# Patient Record
Sex: MALE | Race: BLACK OR AFRICAN AMERICAN | NOT HISPANIC OR LATINO | Employment: UNEMPLOYED | URBAN - METROPOLITAN AREA
[De-identification: names, ages, dates, MRNs, and addresses within clinical notes are randomized per-mention and may not be internally consistent; named-entity substitution may affect disease eponyms.]

---

## 2023-11-24 ENCOUNTER — APPOINTMENT (OUTPATIENT)
Dept: RADIOLOGY | Facility: HOSPITAL | Age: 63
DRG: 175 | End: 2023-11-24
Payer: MEDICARE

## 2023-11-24 ENCOUNTER — HOSPITAL ENCOUNTER (INPATIENT)
Facility: HOSPITAL | Age: 63
LOS: 7 days | Discharge: HOME | DRG: 175 | End: 2023-12-01
Attending: STUDENT IN AN ORGANIZED HEALTH CARE EDUCATION/TRAINING PROGRAM | Admitting: INTERNAL MEDICINE
Payer: MEDICARE

## 2023-11-24 DIAGNOSIS — K21.9 GASTROESOPHAGEAL REFLUX DISEASE WITHOUT ESOPHAGITIS: ICD-10-CM

## 2023-11-24 DIAGNOSIS — R18.8 OTHER ASCITES: Primary | ICD-10-CM

## 2023-11-24 DIAGNOSIS — F17.209 NICOTINE DEPENDENCE WITH NICOTINE-INDUCED DISORDER, UNSPECIFIED NICOTINE PRODUCT TYPE: ICD-10-CM

## 2023-11-24 DIAGNOSIS — R10.84 GENERALIZED ABDOMINAL PAIN: ICD-10-CM

## 2023-11-24 DIAGNOSIS — R79.9 ABNORMAL FINDING OF BLOOD CHEMISTRY, UNSPECIFIED: ICD-10-CM

## 2023-11-24 DIAGNOSIS — K76.9 CHRONIC LIVER DISEASE: ICD-10-CM

## 2023-11-24 DIAGNOSIS — J44.9 CHRONIC OBSTRUCTIVE PULMONARY DISEASE, UNSPECIFIED COPD TYPE (MULTI): ICD-10-CM

## 2023-11-24 DIAGNOSIS — G47.00 INSOMNIA, UNSPECIFIED TYPE: ICD-10-CM

## 2023-11-24 DIAGNOSIS — M79.89 SWELLING OF BOTH LOWER EXTREMITIES: ICD-10-CM

## 2023-11-24 DIAGNOSIS — F20.9 SCHIZOPHRENIA, UNSPECIFIED TYPE (MULTI): ICD-10-CM

## 2023-11-24 DIAGNOSIS — I82.4Z9 DEEP VEIN THROMBOSIS (DVT) OF DISTAL VEIN OF LOWER EXTREMITY, UNSPECIFIED CHRONICITY, UNSPECIFIED LATERALITY (MULTI): ICD-10-CM

## 2023-11-24 DIAGNOSIS — I26.99 ACUTE PULMONARY EMBOLISM WITHOUT ACUTE COR PULMONALE, UNSPECIFIED PULMONARY EMBOLISM TYPE (MULTI): ICD-10-CM

## 2023-11-24 DIAGNOSIS — F10.10 ALCOHOL ABUSE: ICD-10-CM

## 2023-11-24 LAB
ALBUMIN SERPL BCP-MCNC: 1.8 G/DL (ref 3.4–5)
ALBUMIN SERPL BCP-MCNC: 2.2 G/DL (ref 3.4–5)
ALP SERPL-CCNC: 137 U/L (ref 33–136)
ALT SERPL W P-5'-P-CCNC: 20 U/L (ref 10–52)
ANION GAP BLDV CALCULATED.4IONS-SCNC: 2 MMOL/L (ref 10–25)
ANION GAP SERPL CALC-SCNC: 11 MMOL/L (ref 10–20)
ANION GAP SERPL CALC-SCNC: 12 MMOL/L (ref 10–20)
APTT PPP: 35 SECONDS (ref 27–38)
AST SERPL W P-5'-P-CCNC: 43 U/L (ref 9–39)
BASE EXCESS BLDV CALC-SCNC: 11.2 MMOL/L (ref -2–3)
BASOPHILS # BLD AUTO: 0.02 X10*3/UL (ref 0–0.1)
BASOPHILS NFR BLD AUTO: 0.2 %
BILIRUB SERPL-MCNC: 1.2 MG/DL (ref 0–1.2)
BNP SERPL-MCNC: 54 PG/ML (ref 0–99)
BODY TEMPERATURE: 37 DEGREES CELSIUS
BUN SERPL-MCNC: 5 MG/DL (ref 6–23)
BUN SERPL-MCNC: 6 MG/DL (ref 6–23)
CA-I BLDV-SCNC: 1.01 MMOL/L (ref 1.1–1.33)
CALCIUM SERPL-MCNC: 7 MG/DL (ref 8.6–10.6)
CALCIUM SERPL-MCNC: 7.8 MG/DL (ref 8.6–10.6)
CARDIAC TROPONIN I PNL SERPL HS: 30 NG/L (ref 0–53)
CHLORIDE BLDV-SCNC: 98 MMOL/L (ref 98–107)
CHLORIDE SERPL-SCNC: 97 MMOL/L (ref 98–107)
CHLORIDE SERPL-SCNC: 98 MMOL/L (ref 98–107)
CO2 SERPL-SCNC: 30 MMOL/L (ref 21–32)
CO2 SERPL-SCNC: 33 MMOL/L (ref 21–32)
CREAT SERPL-MCNC: 0.78 MG/DL (ref 0.5–1.3)
CREAT SERPL-MCNC: 0.87 MG/DL (ref 0.5–1.3)
EOSINOPHIL # BLD AUTO: 0.01 X10*3/UL (ref 0–0.7)
EOSINOPHIL NFR BLD AUTO: 0.1 %
ERYTHROCYTE [DISTWIDTH] IN BLOOD BY AUTOMATED COUNT: 16.8 % (ref 11.5–14.5)
FERRITIN SERPL-MCNC: 1115 NG/ML (ref 20–300)
FLUAV RNA RESP QL NAA+PROBE: NOT DETECTED
FLUBV RNA RESP QL NAA+PROBE: NOT DETECTED
FOLATE SERPL-MCNC: 23.4 NG/ML
GFR SERPL CREATININE-BSD FRML MDRD: >90 ML/MIN/1.73M*2
GFR SERPL CREATININE-BSD FRML MDRD: >90 ML/MIN/1.73M*2
GLUCOSE BLDV-MCNC: 120 MG/DL (ref 74–99)
GLUCOSE SERPL-MCNC: 111 MG/DL (ref 74–99)
GLUCOSE SERPL-MCNC: 124 MG/DL (ref 74–99)
HAV IGM SER QL: NONREACTIVE
HBV CORE IGM SER QL: NONREACTIVE
HBV SURFACE AG SERPL QL IA: NONREACTIVE
HCO3 BLDV-SCNC: 35.8 MMOL/L (ref 22–26)
HCT VFR BLD AUTO: 34 % (ref 41–52)
HCT VFR BLD EST: 30 % (ref 41–52)
HCV AB SER QL: NONREACTIVE
HGB BLD-MCNC: 11.8 G/DL (ref 13.5–17.5)
HGB BLDV-MCNC: 10.1 G/DL (ref 13.5–17.5)
IMM GRANULOCYTES # BLD AUTO: 0.03 X10*3/UL (ref 0–0.7)
IMM GRANULOCYTES NFR BLD AUTO: 0.3 % (ref 0–0.9)
INR PPP: 1.3 (ref 0.9–1.1)
IRON SATN MFR SERPL: ABNORMAL %
IRON SERPL-MCNC: 119 UG/DL (ref 35–150)
LACTATE BLDV-SCNC: 2 MMOL/L (ref 0.4–2)
LACTATE SERPL-SCNC: 2.4 MMOL/L (ref 0.4–2)
LACTATE SERPL-SCNC: 2.8 MMOL/L (ref 0.4–2)
LYMPHOCYTES # BLD AUTO: 1.96 X10*3/UL (ref 1.2–4.8)
LYMPHOCYTES NFR BLD AUTO: 18.6 %
MCH RBC QN AUTO: 35.5 PG (ref 26–34)
MCHC RBC AUTO-ENTMCNC: 34.7 G/DL (ref 32–36)
MCV RBC AUTO: 102 FL (ref 80–100)
MONOCYTES # BLD AUTO: 0.52 X10*3/UL (ref 0.1–1)
MONOCYTES NFR BLD AUTO: 4.9 %
NEUTROPHILS # BLD AUTO: 7.97 X10*3/UL (ref 1.2–7.7)
NEUTROPHILS NFR BLD AUTO: 75.9 %
NRBC BLD-RTO: 0 /100 WBCS (ref 0–0)
OXYHGB MFR BLDV: 43.5 % (ref 45–75)
PCO2 BLDV: 47 MM HG (ref 41–51)
PH BLDV: 7.49 PH (ref 7.33–7.43)
PHOSPHATE SERPL-MCNC: 3.5 MG/DL (ref 2.5–4.9)
PLATELET # BLD AUTO: 154 X10*3/UL (ref 150–450)
PO2 BLDV: 35 MM HG (ref 35–45)
POTASSIUM BLDV-SCNC: 4.2 MMOL/L (ref 3.5–5.3)
POTASSIUM SERPL-SCNC: 2.9 MMOL/L (ref 3.5–5.3)
POTASSIUM SERPL-SCNC: 3.7 MMOL/L (ref 3.5–5.3)
PROT SERPL-MCNC: 5.7 G/DL (ref 6.4–8.2)
PROTHROMBIN TIME: 14.1 SECONDS (ref 9.8–12.8)
RBC # BLD AUTO: 3.32 X10*6/UL (ref 4.5–5.9)
SAO2 % BLDV: 45 % (ref 45–75)
SARS-COV-2 RNA RESP QL NAA+PROBE: NOT DETECTED
SODIUM BLDV-SCNC: 132 MMOL/L (ref 136–145)
SODIUM SERPL-SCNC: 136 MMOL/L (ref 136–145)
SODIUM SERPL-SCNC: 138 MMOL/L (ref 136–145)
TIBC SERPL-MCNC: ABNORMAL UG/DL
UFH PPP CHRO-ACNC: 1.6 IU/ML
UIBC SERPL-MCNC: <55 UG/DL (ref 110–370)
VIT B12 SERPL-MCNC: 1349 PG/ML (ref 211–911)
WBC # BLD AUTO: 10.5 X10*3/UL (ref 4.4–11.3)

## 2023-11-24 PROCEDURE — 83550 IRON BINDING TEST: CPT

## 2023-11-24 PROCEDURE — 84295 ASSAY OF SERUM SODIUM: CPT

## 2023-11-24 PROCEDURE — 99285 EMERGENCY DEPT VISIT HI MDM: CPT | Performed by: STUDENT IN AN ORGANIZED HEALTH CARE EDUCATION/TRAINING PROGRAM

## 2023-11-24 PROCEDURE — 74177 CT ABD & PELVIS W/CONTRAST: CPT

## 2023-11-24 PROCEDURE — 2500000004 HC RX 250 GENERAL PHARMACY W/ HCPCS (ALT 636 FOR OP/ED)

## 2023-11-24 PROCEDURE — 85610 PROTHROMBIN TIME: CPT

## 2023-11-24 PROCEDURE — 99223 1ST HOSP IP/OBS HIGH 75: CPT | Performed by: INTERNAL MEDICINE

## 2023-11-24 PROCEDURE — 82435 ASSAY OF BLOOD CHLORIDE: CPT

## 2023-11-24 PROCEDURE — 2500000001 HC RX 250 WO HCPCS SELF ADMINISTERED DRUGS (ALT 637 FOR MEDICARE OP)

## 2023-11-24 PROCEDURE — 85025 COMPLETE CBC W/AUTO DIFF WBC: CPT | Performed by: STUDENT IN AN ORGANIZED HEALTH CARE EDUCATION/TRAINING PROGRAM

## 2023-11-24 PROCEDURE — 74177 CT ABD & PELVIS W/CONTRAST: CPT | Performed by: RADIOLOGY

## 2023-11-24 PROCEDURE — 82607 VITAMIN B-12: CPT

## 2023-11-24 PROCEDURE — 86381 MITOCHONDRIAL ANTIBODY EACH: CPT | Performed by: STUDENT IN AN ORGANIZED HEALTH CARE EDUCATION/TRAINING PROGRAM

## 2023-11-24 PROCEDURE — 80074 ACUTE HEPATITIS PANEL: CPT

## 2023-11-24 PROCEDURE — 83880 ASSAY OF NATRIURETIC PEPTIDE: CPT | Performed by: STUDENT IN AN ORGANIZED HEALTH CARE EDUCATION/TRAINING PROGRAM

## 2023-11-24 PROCEDURE — 96366 THER/PROPH/DIAG IV INF ADDON: CPT

## 2023-11-24 PROCEDURE — 93010 ELECTROCARDIOGRAM REPORT: CPT | Performed by: STUDENT IN AN ORGANIZED HEALTH CARE EDUCATION/TRAINING PROGRAM

## 2023-11-24 PROCEDURE — 86235 NUCLEAR ANTIGEN ANTIBODY: CPT | Performed by: STUDENT IN AN ORGANIZED HEALTH CARE EDUCATION/TRAINING PROGRAM

## 2023-11-24 PROCEDURE — 85018 HEMOGLOBIN: CPT

## 2023-11-24 PROCEDURE — 83605 ASSAY OF LACTIC ACID: CPT

## 2023-11-24 PROCEDURE — 86364 TISS TRNSGLTMNASE EA IG CLAS: CPT | Performed by: STUDENT IN AN ORGANIZED HEALTH CARE EDUCATION/TRAINING PROGRAM

## 2023-11-24 PROCEDURE — 83521 IG LIGHT CHAINS FREE EACH: CPT | Performed by: STUDENT IN AN ORGANIZED HEALTH CARE EDUCATION/TRAINING PROGRAM

## 2023-11-24 PROCEDURE — 71275 CT ANGIOGRAPHY CHEST: CPT

## 2023-11-24 PROCEDURE — 96365 THER/PROPH/DIAG IV INF INIT: CPT

## 2023-11-24 PROCEDURE — 84484 ASSAY OF TROPONIN QUANT: CPT | Performed by: STUDENT IN AN ORGANIZED HEALTH CARE EDUCATION/TRAINING PROGRAM

## 2023-11-24 PROCEDURE — 36415 COLL VENOUS BLD VENIPUNCTURE: CPT

## 2023-11-24 PROCEDURE — 85730 THROMBOPLASTIN TIME PARTIAL: CPT

## 2023-11-24 PROCEDURE — 1100000001 HC PRIVATE ROOM DAILY

## 2023-11-24 PROCEDURE — 36415 COLL VENOUS BLD VENIPUNCTURE: CPT | Performed by: STUDENT IN AN ORGANIZED HEALTH CARE EDUCATION/TRAINING PROGRAM

## 2023-11-24 PROCEDURE — 85520 HEPARIN ASSAY: CPT

## 2023-11-24 PROCEDURE — 82390 ASSAY OF CERULOPLASMIN: CPT | Performed by: STUDENT IN AN ORGANIZED HEALTH CARE EDUCATION/TRAINING PROGRAM

## 2023-11-24 PROCEDURE — 80053 COMPREHEN METABOLIC PANEL: CPT | Performed by: STUDENT IN AN ORGANIZED HEALTH CARE EDUCATION/TRAINING PROGRAM

## 2023-11-24 PROCEDURE — 2500000004 HC RX 250 GENERAL PHARMACY W/ HCPCS (ALT 636 FOR OP/ED): Performed by: STUDENT IN AN ORGANIZED HEALTH CARE EDUCATION/TRAINING PROGRAM

## 2023-11-24 PROCEDURE — 2550000001 HC RX 255 CONTRASTS: Performed by: EMERGENCY MEDICINE

## 2023-11-24 PROCEDURE — 84165 PROTEIN E-PHORESIS SERUM: CPT | Performed by: STUDENT IN AN ORGANIZED HEALTH CARE EDUCATION/TRAINING PROGRAM

## 2023-11-24 PROCEDURE — 82728 ASSAY OF FERRITIN: CPT

## 2023-11-24 PROCEDURE — 80061 LIPID PANEL: CPT

## 2023-11-24 PROCEDURE — 86015 ACTIN ANTIBODY EACH: CPT | Performed by: STUDENT IN AN ORGANIZED HEALTH CARE EDUCATION/TRAINING PROGRAM

## 2023-11-24 PROCEDURE — 82746 ASSAY OF FOLIC ACID SERUM: CPT

## 2023-11-24 PROCEDURE — 86038 ANTINUCLEAR ANTIBODIES: CPT | Performed by: STUDENT IN AN ORGANIZED HEALTH CARE EDUCATION/TRAINING PROGRAM

## 2023-11-24 PROCEDURE — 96375 TX/PRO/DX INJ NEW DRUG ADDON: CPT

## 2023-11-24 PROCEDURE — 87636 SARSCOV2 & INF A&B AMP PRB: CPT | Performed by: STUDENT IN AN ORGANIZED HEALTH CARE EDUCATION/TRAINING PROGRAM

## 2023-11-24 PROCEDURE — 2550000001 HC RX 255 CONTRASTS: Performed by: STUDENT IN AN ORGANIZED HEALTH CARE EDUCATION/TRAINING PROGRAM

## 2023-11-24 PROCEDURE — 99285 EMERGENCY DEPT VISIT HI MDM: CPT | Mod: 25 | Performed by: STUDENT IN AN ORGANIZED HEALTH CARE EDUCATION/TRAINING PROGRAM

## 2023-11-24 PROCEDURE — 86320 SERUM IMMUNOELECTROPHORESIS: CPT | Performed by: STUDENT IN AN ORGANIZED HEALTH CARE EDUCATION/TRAINING PROGRAM

## 2023-11-24 RX ORDER — ACETAMINOPHEN 650 MG/1
650 SUPPOSITORY RECTAL EVERY 4 HOURS PRN
Status: DISCONTINUED | OUTPATIENT
Start: 2023-11-24 | End: 2023-11-26

## 2023-11-24 RX ORDER — LORAZEPAM 1 MG/1
2 TABLET ORAL EVERY 2 HOUR PRN
Status: DISCONTINUED | OUTPATIENT
Start: 2023-11-24 | End: 2023-11-28

## 2023-11-24 RX ORDER — MORPHINE SULFATE 4 MG/ML
2 INJECTION INTRAVENOUS ONCE
Status: COMPLETED | OUTPATIENT
Start: 2023-11-24 | End: 2023-11-24

## 2023-11-24 RX ORDER — HEPARIN SODIUM 5000 [USP'U]/ML
2000-4000 INJECTION, SOLUTION INTRAVENOUS; SUBCUTANEOUS EVERY 4 HOURS PRN
Status: DISPENSED | OUTPATIENT
Start: 2023-11-24 | End: 2023-11-28

## 2023-11-24 RX ORDER — POTASSIUM CHLORIDE 14.9 MG/ML
20 INJECTION INTRAVENOUS
Status: COMPLETED | OUTPATIENT
Start: 2023-11-24 | End: 2023-11-24

## 2023-11-24 RX ORDER — HEPARIN SODIUM 10000 [USP'U]/100ML
0-4500 INJECTION, SOLUTION INTRAVENOUS CONTINUOUS
Status: DISPENSED | OUTPATIENT
Start: 2023-11-24 | End: 2023-11-28

## 2023-11-24 RX ORDER — LORAZEPAM 1 MG/1
1 TABLET ORAL EVERY 2 HOUR PRN
Status: DISCONTINUED | OUTPATIENT
Start: 2023-11-24 | End: 2023-11-28

## 2023-11-24 RX ORDER — HEPARIN SODIUM 5000 [USP'U]/ML
80 INJECTION, SOLUTION INTRAVENOUS; SUBCUTANEOUS ONCE
Status: COMPLETED | OUTPATIENT
Start: 2023-11-24 | End: 2023-11-24

## 2023-11-24 RX ORDER — FOLIC ACID 1 MG/1
1 TABLET ORAL DAILY
Status: DISCONTINUED | OUTPATIENT
Start: 2023-11-24 | End: 2023-12-01 | Stop reason: HOSPADM

## 2023-11-24 RX ORDER — CEFTRIAXONE 1 G/50ML
1 INJECTION, SOLUTION INTRAVENOUS ONCE
Status: COMPLETED | OUTPATIENT
Start: 2023-11-24 | End: 2023-11-24

## 2023-11-24 RX ORDER — MULTIVIT-MIN/IRON FUM/FOLIC AC 7.5 MG-4
1 TABLET ORAL DAILY
Status: DISCONTINUED | OUTPATIENT
Start: 2023-11-24 | End: 2023-12-01 | Stop reason: HOSPADM

## 2023-11-24 RX ORDER — ACETAMINOPHEN 325 MG/1
650 TABLET ORAL EVERY 4 HOURS PRN
Status: DISCONTINUED | OUTPATIENT
Start: 2023-11-24 | End: 2023-12-01 | Stop reason: HOSPADM

## 2023-11-24 RX ORDER — LORAZEPAM 0.5 MG/1
0.5 TABLET ORAL EVERY 2 HOUR PRN
Status: DISCONTINUED | OUTPATIENT
Start: 2023-11-24 | End: 2023-11-28

## 2023-11-24 RX ORDER — FLUTICASONE FUROATE AND VILANTEROL 200; 25 UG/1; UG/1
1 POWDER RESPIRATORY (INHALATION)
Status: DISCONTINUED | OUTPATIENT
Start: 2023-11-25 | End: 2023-12-01 | Stop reason: HOSPADM

## 2023-11-24 RX ORDER — ACETAMINOPHEN 160 MG/5ML
650 SOLUTION ORAL EVERY 4 HOURS PRN
Status: DISCONTINUED | OUTPATIENT
Start: 2023-11-24 | End: 2023-11-26

## 2023-11-24 RX ORDER — ACETAMINOPHEN 325 MG/1
650 TABLET ORAL EVERY 4 HOURS PRN
Status: DISCONTINUED | OUTPATIENT
Start: 2023-11-24 | End: 2023-11-26

## 2023-11-24 RX ORDER — ALBUTEROL SULFATE 0.83 MG/ML
2.5 SOLUTION RESPIRATORY (INHALATION) EVERY 6 HOURS PRN
Status: DISCONTINUED | OUTPATIENT
Start: 2023-11-24 | End: 2023-12-01 | Stop reason: HOSPADM

## 2023-11-24 RX ORDER — RISPERIDONE 1 MG/1
1 TABLET ORAL 2 TIMES DAILY
Status: DISCONTINUED | OUTPATIENT
Start: 2023-11-24 | End: 2023-12-01 | Stop reason: HOSPADM

## 2023-11-24 RX ADMIN — RISPERIDONE 1 MG: 1 TABLET ORAL at 21:14

## 2023-11-24 RX ADMIN — SODIUM CHLORIDE, POTASSIUM CHLORIDE, SODIUM LACTATE AND CALCIUM CHLORIDE 500 ML: 600; 310; 30; 20 INJECTION, SOLUTION INTRAVENOUS at 15:00

## 2023-11-24 RX ADMIN — IOHEXOL 75 ML: 350 INJECTION, SOLUTION INTRAVENOUS at 13:39

## 2023-11-24 RX ADMIN — FOLIC ACID 1 MG: 1 TABLET ORAL at 18:49

## 2023-11-24 RX ADMIN — Medication 1 TABLET: at 18:49

## 2023-11-24 RX ADMIN — ACETAMINOPHEN 650 MG: 325 TABLET ORAL at 18:49

## 2023-11-24 RX ADMIN — CEFTRIAXONE SODIUM 1 G: 1 INJECTION, SOLUTION INTRAVENOUS at 16:48

## 2023-11-24 RX ADMIN — POTASSIUM CHLORIDE 20 MEQ: 14.9 INJECTION, SOLUTION INTRAVENOUS at 14:21

## 2023-11-24 RX ADMIN — ACETAMINOPHEN 650 MG: 325 TABLET ORAL at 12:51

## 2023-11-24 RX ADMIN — HEPARIN SODIUM 4000 UNITS: 5000 INJECTION INTRAVENOUS; SUBCUTANEOUS at 17:33

## 2023-11-24 RX ADMIN — HEPARIN SODIUM 1790 UNITS/HR: 10000 INJECTION, SOLUTION INTRAVENOUS at 17:35

## 2023-11-24 RX ADMIN — POTASSIUM CHLORIDE 20 MEQ: 14.9 INJECTION, SOLUTION INTRAVENOUS at 12:32

## 2023-11-24 RX ADMIN — MORPHINE SULFATE 2 MG: 4 INJECTION INTRAVENOUS at 16:48

## 2023-11-24 RX ADMIN — IOHEXOL 61 ML: 350 INJECTION, SOLUTION INTRAVENOUS at 15:25

## 2023-11-24 ASSESSMENT — COGNITIVE AND FUNCTIONAL STATUS - GENERAL
MOBILITY SCORE: 24
DAILY ACTIVITIY SCORE: 24

## 2023-11-24 ASSESSMENT — PAIN SCALES - GENERAL
PAINLEVEL_OUTOF10: 8
PAINLEVEL_OUTOF10: 0 - NO PAIN
PAINLEVEL_OUTOF10: 8
PAINLEVEL_OUTOF10: 8

## 2023-11-24 ASSESSMENT — LIFESTYLE VARIABLES
ANXIETY: NO ANXIETY, AT EASE
EVER HAD A DRINK FIRST THING IN THE MORNING TO STEADY YOUR NERVES TO GET RID OF A HANGOVER: NO
HAVE YOU EVER FELT YOU SHOULD CUT DOWN ON YOUR DRINKING: NO
NAUSEA AND VOMITING: NO NAUSEA AND NO VOMITING
PAROXYSMAL SWEATS: NO SWEAT VISIBLE
REASON UNABLE TO ASSESS: NO
VISUAL DISTURBANCES: NOT PRESENT
ORIENTATION AND CLOUDING OF SENSORIUM: ORIENTED AND CAN DO SERIAL ADDITIONS
AUDITORY DISTURBANCES: NOT PRESENT
ORIENTATION AND CLOUDING OF SENSORIUM: ORIENTED AND CAN DO SERIAL ADDITIONS
PAROXYSMAL SWEATS: NO SWEAT VISIBLE
HAVE PEOPLE ANNOYED YOU BY CRITICIZING YOUR DRINKING: NO
TREMOR: NO TREMOR
HEADACHE, FULLNESS IN HEAD: NOT PRESENT
VISUAL DISTURBANCES: NOT PRESENT
ANXIETY: NO ANXIETY, AT EASE
HEADACHE, FULLNESS IN HEAD: NOT PRESENT
AGITATION: NORMAL ACTIVITY
NAUSEA AND VOMITING: NO NAUSEA AND NO VOMITING
AGITATION: NORMAL ACTIVITY
TOTAL SCORE: 0
AUDITORY DISTURBANCES: NOT PRESENT
TOTAL SCORE: 0
TREMOR: NO TREMOR
EVER FELT BAD OR GUILTY ABOUT YOUR DRINKING: NO

## 2023-11-24 ASSESSMENT — PAIN DESCRIPTION - PROGRESSION
CLINICAL_PROGRESSION: NOT CHANGED
CLINICAL_PROGRESSION: NOT CHANGED

## 2023-11-24 ASSESSMENT — COLUMBIA-SUICIDE SEVERITY RATING SCALE - C-SSRS
6. HAVE YOU EVER DONE ANYTHING, STARTED TO DO ANYTHING, OR PREPARED TO DO ANYTHING TO END YOUR LIFE?: NO
2. HAVE YOU ACTUALLY HAD ANY THOUGHTS OF KILLING YOURSELF?: NO
1. IN THE PAST MONTH, HAVE YOU WISHED YOU WERE DEAD OR WISHED YOU COULD GO TO SLEEP AND NOT WAKE UP?: NO

## 2023-11-24 ASSESSMENT — PAIN - FUNCTIONAL ASSESSMENT: PAIN_FUNCTIONAL_ASSESSMENT: 0-10

## 2023-11-24 NOTE — H&P
History Of Present Illness  Junito Rodríguez is a 62 y.o. male with past medical history of depression, anxiety, COPD(PFTs not on file), alcohol abuse disorder, significant tobacco use, and chronic lower back pain.  Patient presented to the ED with 7-day history of leg swelling,Abdominal swelling, shortness of breath, and anorexia.  Patient stated that the symptoms started spontaneously 7 days ago.  Shortness of breath is worse with activity and relieved by rest.  Leg swelling is bilateral  has been present for a week and is painful.  Anorexia seem to be due to aversion to eating not by pain or nausea patient is consuming liquids without issue patient has not passed stool for the past week however passes gas.  History notable for PND, exertional dyspnea, calf pain, weight loss of 30 pounds over the past 3.5 months, and positional dizziness.  Patient denies recent illness, recent travel, chest pain, palpitations, difficulty urinating, any signs of bleeding, nausea, vomiting, fever, and chills.    Patient was seen in the ED found to have hypokalemia that was repleted.  CT abdomen was ordered that suggested filling defect it was followed up by a CT PE which showed bilateral PEs right greater than left.    ED course:  Vitals :  Heart rate: 122.  /84.  96% O2 on room air.  37.1 °C.  Respiratory rate 19.      Labs:  Labs Reviewed   CBC WITH AUTO DIFFERENTIAL - Abnormal       Result Value    WBC 10.5      nRBC 0.0      RBC 3.32 (*)     Hemoglobin 11.8 (*)     Hematocrit 34.0 (*)      (*)     MCH 35.5 (*)     MCHC 34.7      RDW 16.8 (*)     Platelets 154      Neutrophils % 75.9      Immature Granulocytes %, Automated 0.3      Lymphocytes % 18.6      Monocytes % 4.9      Eosinophils % 0.1      Basophils % 0.2      Neutrophils Absolute 7.97 (*)     Immature Granulocytes Absolute, Automated 0.03      Lymphocytes Absolute 1.96      Monocytes Absolute 0.52      Eosinophils Absolute 0.01      Basophils Absolute 0.02      COMPREHENSIVE METABOLIC PANEL - Abnormal    Glucose 124 (*)     Sodium 138      Potassium 2.9 (*)     Chloride 97 (*)     Bicarbonate 33 (*)     Anion Gap 11      Urea Nitrogen 5 (*)     Creatinine 0.87      eGFR >90      Calcium 7.8 (*)     Albumin 2.2 (*)     Alkaline Phosphatase 137 (*)     Total Protein 5.7 (*)     AST 43 (*)     Bilirubin, Total 1.2      ALT 20     LACTATE - Abnormal    Lactate 2.4 (*)     Narrative:     Venipuncture immediately after or during the administration of Metamizole may lead to falsely low results. Testing should be performed immediately  prior to Metamizole dosing.   PROTIME-INR - Abnormal    Protime 14.1 (*)     INR 1.3 (*)    BLOOD GAS VENOUS FULL PANEL UNSOLICITED - Abnormal    POCT pH, Venous 7.49 (*)     POCT pCO2, Venous 47      POCT pO2, Venous 35      POCT SO2, Venous 45      POCT Oxy Hemoglobin, Venous 43.5 (*)     POCT Hematocrit Calculated, Venous 30.0 (*)     POCT Sodium, Venous 132 (*)     POCT Potassium, Venous 4.2      POCT Chloride, Venous 98      POCT Ionized Calicum, Venous 1.01 (*)     POCT Glucose, Venous 120 (*)     POCT Lactate, Venous 2.0      POCT Base Excess, Venous 11.2 (*)     POCT HCO3 Calculated, Venous 35.8 (*)     POCT Hemoglobin, Venous 10.1 (*)     POCT Anion Gap, Venous 2.0 (*)     Patient Temperature 37.0     TROPONIN I, HIGH SENSITIVITY - Normal    Troponin I, High Sensitivity 30      Narrative:     Less than 99th percentile of normal range cutoff-  Female and children under 18 years old <35 ng/L; Male <54 ng/L: Negative  Repeat testing should be performed if clinically indicated.     Female and children under 18 years old  ng/L; Male  ng/L:  Consistent with possible cardiac damage and possible increased clinical   risk. Serial measurements may help to assess extent of myocardial damage.     >120 ng/L: Consistent with cardiac damage, increased clinical risk and  myocardial infarction. Serial measurements may help assess extent of    myocardial damage.      NOTE: Children less than 1 year old may have higher baseline troponin   levels and results should be interpreted in conjunction with the overall   clinical context.    NOTE: Troponin I testing is performed using a different   testing methodology at [facility] than at other   [facility] hospitals. Direct result comparisons should only   be made within the same method.     B-TYPE NATRIURETIC PEPTIDE - Normal    BNP 54      Narrative:        <100 pg/mL - Heart failure unlikely  100-299 pg/mL - Intermediate probability of acute heart                  failure exacerbation. Correlate with clinical                  context and patient history.    >=300 pg/mL - Heart Failure likely. Correlate with clinical                  context and patient history.     Biotin interference may cause falsely decreased results. Patients taking a Biotin dose of up to 5 mg/day should refrain from taking Biotin for 24 hours before sample  collection. Providers may contact their local laboratory for further information.   SARS-COV-2 AND INFLUENZA A/B PCR - Normal    Flu A Result Not Detected      Flu B Result Not Detected      Coronavirus 2019, PCR Not Detected      Narrative:     This assay has received FDA Emergency Use Authorization (EUA) and  is only authorized for the duration of time that circumstances exist to justify the authorization of the emergency use of in vitro diagnostic tests for the detection of SARS-CoV-2 virus and/or diagnosis of COVID-19 infection under section 564(b)(1) of the Act, 21 U.S.C. 360bbb-3(b)(1). Testing for SARS-CoV-2 is only recommended for patients who meet current clinical and/or epidemiological criteria as defined by federal, state, or local public health directives. This assay is an in vitro diagnostic nucleic acid amplification test for the qualitative detection of SARS-CoV-2, Influenza A, and Influenza B from nasopharyngeal specimens and has been validated for use at  [facility]. Negative results do not preclude COVID-19 infections or Influenza A/B infections, and should not be used as the sole basis for diagnosis, treatment, or other management decisions. If Influenza A/B and RSV PCR results are negative, testing for Parainfluenza virus, Adenovirus and Metapneumovirus is routinely performed for [facility]ology and intensive care inpatients, and is available on other patients by placing an add-on request.    APTT - Normal    aPTT 35      Narrative:     The APTT is no longer used for monitoring Unfractionated Heparin Therapy. For monitoring Heparin Therapy, use the Heparin Assay.   BLOOD GAS VENOUS FULL PANEL   LACTATE   HEPATITIS PANEL, ACUTE   RENAL FUNCTION PANEL   IRON AND TIBC   FERRITIN   URINALYSIS WITH REFLEX MICROSCOPIC AND CULTURE   FOLATE   VITAMIN B12   DRUG SCREEN, URINE WITH REFLEX TO CONFIRMATION     Imaging:   CT angio chest for pulmonary embolism   Final Result   1. Multiple bilateral pulmonary emboli, right-greater-than-left with   the largest embolism located in the right middle lobar artery.   Additional involvement of the right upper and lower lobes and   segmental/subsegmental involvement in the left lower lobe.   2. There are consolidative opacities in the dependent portions of the   bilateral lungs, right-greater-than-left which are nonspecific and   may represent atelectasis. In the setting of pulmonary embolism, a   developing parenchymal infarct cannot be excluded. Additional   ground-glass opacities in the right middle and upper lobes which are   nonspecific and may represent additional foci of ischemic related   parenchymal changes. Pulmonary edema or multifocal infection are   differential considerations.   3. Redemonstration of smoking related interstitial lung disease.   4. Abdominopelvic ascites and additional abdominal findings are   better characterized on same day CT abdomen and pelvis.        I personally reviewed  the images/study and I agree with the findings   as stated by resident physician Dr. William Harris . This study   was interpreted at [facility],   [place], [place].        MACRO:   William Harris discussed the significance and urgency of this   critical finding by secure chat with  RAND LAM on 11/24/2023 at   3:58 pm.  (**-RCF-**) Findings:  See findings.        Signed by: Bob Ruggiero 11/24/2023 4:11 PM   Dictation workstation:   [id number]      CT abdomen pelvis w IV contrast   Final Result   1. Ground-glass opacities in the right middle and right lower lobe   with associated small right pleural effusion likely infectious or   inflammatory in etiology. Equivocal filling defect in a right lower   lobe segmental pulmonary arterial branch raising concern for   pulmonary embolism. Examination not optimized for evaluation of   pulmonary embolism. Recommend CT PE protocol for further evaluation   to rule out pulmonary embolism.   2. Decreased enhancement of a few of the ileal loops in the mid   abdomen Thickened edematous distal small bowel loops as well as   several segments of the colon which can be infectious or inflammatory   enterocolitis. Given the decreased enhancement of some of the ileal   loops, possibility of ischemic enterocolitis can also be considered   in the differential. Normal enhancement of the aorta and its major   branches. No evidence of pneumatosis of the bowel loops. Correlation   with the laboratory abnormalities is recommended.   3. Diffuse atherosclerotic calcification of the abdominal aorta and   its distal branches. No apparent thrombus within the IVC or SMV.   4. Large volume abdominopelvic ascites.   5. Subtle nodularity of the liver which can be seen with liver   parenchymal disease. Correlate with liver function tests.        I personally reviewed the images/study and I agree with the findings   as stated by resident Rand SOMERS  Hans. This study was interpreted   at [facility], [place], [place].        MACRO:   Resident, Dr. Paolo Maxwell discussed the significance and urgency   of this critical finding by telephone with resident Dr. Paolo Higgins on 11/24/2023 at 2:38 pm.  (**-RCF-**) Findings:  See findings.        Signed by: Sammy Gussavita Hernandez 11/24/2023 4:44 PM   Dictation workstation:   [id number]      Transthoracic Echo (TTE) Complete    (Results Pending)      Interventions    Medications   acetaminophen (Tylenol) tablet 650 mg (650 mg oral Given 11/24/23 1251)   heparin 25,000 Units in dextrose 5% 250 mL (100 Units/mL) infusion (premix) (1,790 Units/hr intravenous New Bag 11/24/23 1735)   heparin (porcine) injection 2,000-4,000 Units (has no administration in time range)   perflutren lipid microspheres (Definity) injection 0.5-10 mL of dilution (has no administration in time range)   sulfur hexafluoride microsphr (Lumason) injection 24.28 mg (has no administration in time range)   perflutren protein A microsphere (Optison) injection 0.5 mL (has no administration in time range)   folic acid (Folvite) tablet 1 mg (has no administration in time range)   multivitamin with minerals 1 tablet (has no administration in time range)   LORazepam (Ativan) tablet 0.5 mg (has no administration in time range)     Or   LORazepam (Ativan) tablet 1 mg (has no administration in time range)     Or   LORazepam (Ativan) tablet 2 mg (has no administration in time range)   acetaminophen (Tylenol) tablet 650 mg (has no administration in time range)     Or   acetaminophen (Tylenol) oral liquid 650 mg (has no administration in time range)     Or   acetaminophen (Tylenol) suppository 650 mg (has no administration in time range)   potassium chloride 20 mEq in 100 mL IV premix (0 mEq intravenous Stopped 11/24/23 1621)   iohexol (OMNIPaque) 350 mg iodine/mL solution 75 mL (75 mL intravenous Given 11/24/23 1339)    lactated Ringer's bolus 500 mL (0 mL intravenous Stopped 23 1700)   iohexol (OMNIPaque) 350 mg iodine/mL solution 61 mL (61 mL intravenous Given 23 1525)   heparin (porcine) injection 4,000 Units (4,000 Units intravenous Given 23 1733)   cefTRIAXone (Rocephin) IVPB 1 g (0 g intravenous Stopped 23 1718)   morphine injection 2 mg (2 mg intravenous Given 23 1648)         Past Medical History  Past medical history significant for depression anxiety COPD alcohol abuse disorder, tobacco use and chronic lower back pain.    Surgical History  Past Surgical History:   Procedure Laterality Date    OTHER SURGICAL HISTORY  2018    Closed Treatment Of Orbital Fracture (Non-'blowout')     Patient past surgical history significant for colonoscopy 3 years ago that revealed tubulous adenoma that were removed with plan for follow-up follow-up at 5 years  Social History  He has no history on file for tobacco use, alcohol use, and drug use.  Patient consumes half a pack per day for the past 40 years.  Patient consumes a pint of wine daily with no plan to quit.  Patient uses marijuana occasionally and denies other illicit drug use.  Patient lives alone.  Family History  Mother  of ovarian cancer.  Brother has schizophrenia.  Sister has a pacemaker in place       Allergies  Patient has no known allergies.    Home Meds:  Albuterol 90 mcg  Adavir  Meloxicam 15 mg  Risperidone 1 mg twice daily.    Review of Systems  Negative unless stated above  Physical Exam   Constitutional: Well-developed male   HEENT: Normocephalic, atraumatic. PERRL. EOMI. No cervical lymphadenopathy.  Respiratory: CTA bilaterally. No wheezes, rales, or rhonchi. Normal respiratory effort.  Cardiovascular: RRR. No murmurs, gallops, or rubs. No JVD. Radial pulses 2+.  Abdominal: Visibly distended abdomen with significant ascites, positive for transmitted thrill sign. Bowel sounds present. No hepatosplenomegaly or masses. Back pain  "present  Neuro: CN II-XII intact. UE and LE strength 5/5 bilaterally and sensation intact. Normal FTN testing.  MSK: 3+ pitting edema lower extremity edema up to the knee .  Skin: Warm, dry. No rashes or wounds.  Psych: Appropriate mood and affect.    Last Recorded Vitals  Blood pressure 103/80, pulse 104, temperature 37.1 °C (98.8 °F), temperature source Temporal, resp. rate 18, height 1.753 m (5' 9\"), weight 49.9 kg (110 lb), SpO2 99 %.         Assessment/Plan   Principal Problem:    Acute pulmonary embolism without acute cor pulmonale, unspecified pulmonary embolism type (CMS/HCC)      Junito Rodríguez is a 62 y.o. male with past medical history of depression, anxiety, COPD(PFTs not on file), alcohol abuse disorder, significant tobacco use, and chronic lower back pain.  Patient presented to the ED with 7-day history of leg swelling,Abdominal swelling, shortness of breath, and anorexia.  Was found to have significant ascites and bilateral lower extremity edema on exam.  Imaging was significant for bilateral PE's and parenchymal liver disease (subtle nodularity) likely alcoholic cirrhosis.    #Bilateral PE's  #Dyspnea on exertion  :: Evident on CT PE  ::Multiple bilateral pulmonary emboli, right-greater-than-left with  the largest embolism located in the right middle lobar artery.  Additional involvement of the right upper and lower lobes and  segmental/subsegmental involvement in the left lower lobe.  :: In the setting of lower extremity swelling highly likely due to DVTs  :: Trop:30 Lactate:2.4  BNP: 54  -Started heparin gtt for anticoagulation   -Echo ordered and pending   -[ ] consider DVT ultrasound lower extremities in AM    #Massive ascites  :: Imaging showing parenchymal disease  :: Labs showing AST over ALT value greater than 2 with AST 43 and ALT  20  :: INR elevated at 1.3  :: Albumin decreased at 1.8  :: Normal bilirubin elevated alk phos  :: Likely in the setting of alcoholic cirrhosis versus right heart " failure from bilateral PEs however unlikely given low BNP.  :: Meld Na score 10  -Hepatitis panel pending  -CA 19, CA, AFP pending  -Drug screen pending  -UA pending  -[ ] Consider US doppler of liver in AM   -CTX 1 g daily for SBP prophylaxis   [ ] Diurese in the AM after electrolytes repleated.    #Possible enterocolitis  #Weight loss, change in appetite  -s/p CTX 1gm in ED  -ordered stool pathogen PCR, lactoferrin, calprotectin  [ ] consider outpatient endoscopy/colonoscopy       #COPD  -Continue home albuterol and Breo Ellipta    #Depression  #Anxiety  -Continue home risperidone 1 mg twice daily    #Alcohol abuse  -[facility] protocol  -Folate supplementation and multivitamin  -Folate and B12 level.    #Chronic Tobacco use  - Nicotine patch     F: Status post 500 mL LR  E: k>4 Mg>2  N: Regular diet  GI ppx: Not indicated   DVT ppx: heparin gtt  Full code  Next of kin: Daughter (Karlee) : [phone]                  Grady Rodriguez MD

## 2023-11-24 NOTE — PROGRESS NOTES
Emergency Medicine Transition of Care Note.    I received Junito Rodríguez in signout from Dr. Weiss.  Please see the previous ED provider note for all HPI, PE and MDM up to the time of signout at 1500. This is in addition to the primary record.    In brief Junito Rodríguez is an 62 y.o. male presenting for   Chief Complaint   Patient presents with    Abdominal Pain     At the time of signout we were awaiting: CT PE, dispo    ED Course as of 11/24/23 1620   Fri Nov 24, 2023   1215 Anion Gap: 11 [WILLOW]      ED Course User Index  [WILLOW] Alis Ward MD         Diagnoses as of 11/24/23 1620   Generalized abdominal pain   Other ascites       Medical Decision Making    Time of signout patient was pending CT PE.  Patient with known history of heavy drinking, had CT of the abdomen concerning for ascites, no sbo.  The low portion of the lung fields demonstrated filling defects concerning for pulmonary embolism.  Patient had no fever but did demonstrate tachycardia, clinically consistent with some pleuritic low lung discomfort.  We will send the patient for CT PE.  Given the filling defects, we were contacted, high probability for PE, as such we had to wait consideration of diagnostic paracentesis versus the risks of bleeding with heparin.  Because the patient has a nontender abdomen, he endorses some abdominal discomfort likely more secondary to the tense nature of his ascites and no fever, more clinically prudent to anticoagulate the patient as opposed to hold given his PEs.  We will defer on diagnostic para as he will likely benefit from a therapeutic later, we will preemptively treat with Rocephin.  Suspicion for SBP is low, full risk-benefit analysis suggests that we should preemptively heparinize, treat and can obtain samples when the patient receives a therapeutic tap when safe and able.  It was communicated to the patient he is comfortable with this planning.  Patient will be admitted    Final diagnoses:   [R10.84]  Generalized abdominal pain   [R18.8] Other ascites         Lazaro Lemon, DO

## 2023-11-24 NOTE — ED TRIAGE NOTES
Pt c/o abd pain and distension, SOB, and BLE edema x8 days. Denies any medical history or hx of similar symptoms. Also report 30lb weight loss over last few months

## 2023-11-24 NOTE — HOSPITAL COURSE
Junito Rodríguez is a 62 y.o. male PMHx of schizophrenia, depression, anxiety, COPD (PFTs not on file), alcohol use disorder, significant tobacco use, and chronic lower back pain.  Patient presented to the ED with 7-day history of leg swelling, abdominal swelling, shortness of breath, and anorexia. Diagnosed with decompensated cirrhosis likely due to alcohol use and bilateral PEs without evidence of right heart strain or hypoxia, extensive R lower extremity DVTs, and IVC thrombus likely provoked in the setting of decreased mobility. Started heparin 11/24, which was switched to Apixaban post paracentesis (11/28). Echo performed on 11/25 showed hyperdynamic EF and no evidence of RV strain. Patient placed on ceftriaxone prophylaxis (11/24) for SBP. Paracentesis (11/28) showed SAAG of 1.5 and without evidence of SBP, thus, ceftriaxone was stopped (11/28). Lactulose was given for hepatic encephalopathy prophylaxis, and Furosamide 20 mg started (11/29) to target excess fluid.  After patient reported difficulty sleeping (11/28), melatonin 3 mg was unsuccessful, and switched to Melatonin 5 mg and Trazodone 50 mg (11/29). CIWA was discontinued (11/28) following 4 days scores of 0. Given persistent mild tachycardia, ECG was scheduled (11/29) and showed normal rhythm.  Spironolactone 50 mg started (11/30) for further diuresis, with Midodrine 5mg TID added for soft Bps to 90s systolic. On day of discharge, he was HDS, clinically improved, and stable for discharge home.

## 2023-11-24 NOTE — ED PROVIDER NOTES
HPI   Chief Complaint   Patient presents with    Abdominal Pain     HPI  Patient is a 62 year old male with past medical history of schizophrenia currently, being managed and is compliant with daily medications, presenting with abdominal pain. Abdominal pain has been ongoing for 8 days, states his belly has been getting more descended and the pain has progressively worsening. Has not had a bowel movement in 8 days. Admits to not being able to tolerate PO diet, but has been able to have liquids, states he has been nauseous and had episode of vomiting. Also states that for the past few days he has been SOB and feels he is unable to catch his breath. There has been a cough with production of green sputum.           Guthrie Coma Scale Score: 15                  Patient History   No past medical history on file.  Past Surgical History:   Procedure Laterality Date    OTHER SURGICAL HISTORY  02/08/2018    Closed Treatment Of Orbital Fracture (Non-'blowout')     No family history on file.  Social History     Tobacco Use    Smoking status: Not on file    Smokeless tobacco: Not on file   Substance Use Topics    Alcohol use: Not on file    Drug use: Not on file     Physical Exam   ED Triage Vitals [11/24/23 0933]   Temp Heart Rate Resp BP   37.1 °C (98.8 °F) (!) 122 19 118/84      SpO2 Temp Source Heart Rate Source Patient Position   96 % Temporal -- --      BP Location FiO2 (%)     -- --       Physical Exam  Constitutional:       Appearance: He is ill-appearing.   Cardiovascular:      Rate and Rhythm: Normal rate.   Pulmonary:      Breath sounds: Normal breath sounds.   Abdominal:      General: Bowel sounds are decreased. There is distension.      Palpations: Abdomen is rigid.      Tenderness: There is generalized abdominal tenderness.   Neurological:      General: No focal deficit present.      Mental Status: He is alert.   Psychiatric:         Mood and Affect: Mood normal.       ED Course & MDM   ED Course as of 11/24/23  1636   Fri Nov 24, 2023   1215 Anion Gap: 11 [WILLOW]      ED Course User Index  [WILLOW] Alis Ward MD         Diagnoses as of 11/24/23 1636   Generalized abdominal pain   Other ascites   Acute pulmonary embolism without acute cor pulmonale, unspecified pulmonary embolism type (CMS/HCC)     Medical Decision Making  Patient is a 62 year old male with past medical history of schizophrenia resenting with abdominal pain. Initial labs show potassium of 2.9, given 20 mEq of potassium chloride. COVID and Flu negative. CT abdomen pelvis obtained. Showed incidental filling defect in the right lower lobe concerning of PE, CT PE was obtained showed multiple bilateral PE right greater than left, largest being right middle lobar artery. Non-specific bilateral consolidative opacities. Patient was started on heparin. Holding on paracentesis for large ascites due to he need to hold anticoagulation post procedure and having known PE. Patient started on 1g rocephin for concern of SBP with ascites. Decreased enhancement of ileal loops of small bowel showing concern for ischemic colitis, serum lactate obtained to rule out colitis. Morphine given for pain. Patient to be admitted for further workup and therapeutic paracentesis.    Procedure  Procedures None     Paolo Higgins DPM  Resident  11/24/23 6249     Yes

## 2023-11-25 ENCOUNTER — APPOINTMENT (OUTPATIENT)
Dept: RADIOLOGY | Facility: HOSPITAL | Age: 63
DRG: 175 | End: 2023-11-25
Payer: MEDICARE

## 2023-11-25 ENCOUNTER — APPOINTMENT (OUTPATIENT)
Dept: CARDIOLOGY | Facility: HOSPITAL | Age: 63
DRG: 175 | End: 2023-11-25
Payer: MEDICARE

## 2023-11-25 LAB
AFP SERPL-MCNC: 5 NG/ML (ref 0–9)
ALBUMIN SERPL BCP-MCNC: 1.8 G/DL (ref 3.4–5)
ALP SERPL-CCNC: 96 U/L (ref 33–136)
ALT SERPL W P-5'-P-CCNC: 15 U/L (ref 10–52)
ANION GAP SERPL CALC-SCNC: 9 MMOL/L (ref 10–20)
AORTIC VALVE PEAK VELOCITY: 1.21
APPEARANCE UR: CLEAR
AST SERPL W P-5'-P-CCNC: 33 U/L (ref 9–39)
AV PEAK GRADIENT: 5.9
AVA (PEAK VEL): 2.17
BASOPHILS # BLD AUTO: 0.03 X10*3/UL (ref 0–0.1)
BASOPHILS NFR BLD AUTO: 0.3 %
BILIRUB SERPL-MCNC: 1.1 MG/DL (ref 0–1.2)
BILIRUB UR STRIP.AUTO-MCNC: NEGATIVE MG/DL
BUN SERPL-MCNC: 6 MG/DL (ref 6–23)
CALCIUM SERPL-MCNC: 6.9 MG/DL (ref 8.6–10.6)
CERULOPLASMIN SERPL-MCNC: 23.7 MG/DL (ref 20–60)
CHLORIDE SERPL-SCNC: 100 MMOL/L (ref 98–107)
CHOLEST SERPL-MCNC: 68 MG/DL (ref 0–199)
CHOLESTEROL/HDL RATIO: 2.4
CO2 SERPL-SCNC: 32 MMOL/L (ref 21–32)
COLOR UR: ABNORMAL
CREAT SERPL-MCNC: 0.76 MG/DL (ref 0.5–1.3)
EJECTION FRACTION APICAL 4 CHAMBER: 77.6
EJECTION FRACTION: 78
EOSINOPHIL # BLD AUTO: 0.02 X10*3/UL (ref 0–0.7)
EOSINOPHIL NFR BLD AUTO: 0.2 %
ERYTHROCYTE [DISTWIDTH] IN BLOOD BY AUTOMATED COUNT: 16.9 % (ref 11.5–14.5)
EST. AVERAGE GLUCOSE BLD GHB EST-MCNC: 77 MG/DL
GFR SERPL CREATININE-BSD FRML MDRD: >90 ML/MIN/1.73M*2
GLIADIN PEPTIDE IGA SER IA-ACNC: 1.1 U/ML
GLIADIN PEPTIDE IGG SER IA-ACNC: <1 U/ML
GLUCOSE SERPL-MCNC: 103 MG/DL (ref 74–99)
GLUCOSE UR STRIP.AUTO-MCNC: NEGATIVE MG/DL
HBA1C MFR BLD: 4.3 %
HCT VFR BLD AUTO: 25.5 % (ref 41–52)
HDLC SERPL-MCNC: 28.4 MG/DL
HGB BLD-MCNC: 8.8 G/DL (ref 13.5–17.5)
HGB RETIC QN: 38 PG (ref 28–38)
HOLD SPECIMEN: NORMAL
IMM GRANULOCYTES # BLD AUTO: 0.03 X10*3/UL (ref 0–0.7)
IMM GRANULOCYTES NFR BLD AUTO: 0.3 % (ref 0–0.9)
IMMATURE RETIC FRACTION: 32.9 %
KETONES UR STRIP.AUTO-MCNC: NEGATIVE MG/DL
LACTATE SERPL-SCNC: 1.7 MMOL/L (ref 0.4–2)
LDLC SERPL CALC-MCNC: 29 MG/DL
LEFT ATRIUM VOLUME AREA LENGTH INDEX BSA: 14.7
LEFT VENTRICLE INTERNAL DIMENSION DIASTOLE: 3.3 (ref 3.5–6)
LEFT VENTRICULAR OUTFLOW TRACT DIAMETER: 1.8
LEUKOCYTE ESTERASE UR QL STRIP.AUTO: NEGATIVE
LYMPHOCYTES # BLD AUTO: 3.17 X10*3/UL (ref 1.2–4.8)
LYMPHOCYTES NFR BLD AUTO: 35.7 %
MAGNESIUM SERPL-MCNC: 1.6 MG/DL (ref 1.6–2.4)
MCH RBC QN AUTO: 35.6 PG (ref 26–34)
MCHC RBC AUTO-ENTMCNC: 34.5 G/DL (ref 32–36)
MCV RBC AUTO: 103 FL (ref 80–100)
MITRAL VALVE E/A RATIO: 0.8
MITRAL VALVE E/E' RATIO: 10.42
MONOCYTES # BLD AUTO: 0.62 X10*3/UL (ref 0.1–1)
MONOCYTES NFR BLD AUTO: 7 %
MUCOUS THREADS #/AREA URNS AUTO: NORMAL /LPF
NEUTROPHILS # BLD AUTO: 5 X10*3/UL (ref 1.2–7.7)
NEUTROPHILS NFR BLD AUTO: 56.5 %
NITRITE UR QL STRIP.AUTO: NEGATIVE
NON HDL CHOLESTEROL: 40 MG/DL (ref 0–149)
NRBC BLD-RTO: 0 /100 WBCS (ref 0–0)
PH UR STRIP.AUTO: 5 [PH]
PHOSPHATE SERPL-MCNC: 3.3 MG/DL (ref 2.5–4.9)
PLATELET # BLD AUTO: 118 X10*3/UL (ref 150–450)
POTASSIUM SERPL-SCNC: 3.8 MMOL/L (ref 3.5–5.3)
PROT SERPL-MCNC: 4.5 G/DL (ref 6.4–8.2)
PROT UR STRIP.AUTO-MCNC: ABNORMAL MG/DL
RBC # BLD AUTO: 2.47 X10*6/UL (ref 4.5–5.9)
RBC # UR STRIP.AUTO: NEGATIVE /UL
RBC #/AREA URNS AUTO: NORMAL /HPF
RETICS #: 0.08 X10*6/UL (ref 0.02–0.12)
RETICS/RBC NFR AUTO: 3.2 % (ref 0.5–2)
RIGHT VENTRICLE FREE WALL PEAK S': 17.5
RIGHT VENTRICLE PEAK SYSTOLIC PRESSURE: 29.4
SODIUM SERPL-SCNC: 137 MMOL/L (ref 136–145)
SP GR UR STRIP.AUTO: 1.01
TRICUSPID ANNULAR PLANE SYSTOLIC EXCURSION: 2
TRIGL SERPL-MCNC: 51 MG/DL (ref 0–149)
TTG IGA SER IA-ACNC: <1 U/ML
TTG IGG SER IA-ACNC: <1 U/ML
UFH PPP CHRO-ACNC: 0.4 IU/ML
UFH PPP CHRO-ACNC: 0.6 IU/ML
UFH PPP CHRO-ACNC: 1 IU/ML
UROBILINOGEN UR STRIP.AUTO-MCNC: 4 MG/DL
VLDL: 10 MG/DL (ref 0–40)
WBC # BLD AUTO: 8.9 X10*3/UL (ref 4.4–11.3)
WBC #/AREA URNS AUTO: NORMAL /HPF

## 2023-11-25 PROCEDURE — 80307 DRUG TEST PRSMV CHEM ANLYZR: CPT

## 2023-11-25 PROCEDURE — 82105 ALPHA-FETOPROTEIN SERUM: CPT

## 2023-11-25 PROCEDURE — 84100 ASSAY OF PHOSPHORUS: CPT

## 2023-11-25 PROCEDURE — 85520 HEPARIN ASSAY: CPT

## 2023-11-25 PROCEDURE — 36415 COLL VENOUS BLD VENIPUNCTURE: CPT

## 2023-11-25 PROCEDURE — 93306 TTE W/DOPPLER COMPLETE: CPT | Performed by: INTERNAL MEDICINE

## 2023-11-25 PROCEDURE — 83605 ASSAY OF LACTIC ACID: CPT

## 2023-11-25 PROCEDURE — 2500000004 HC RX 250 GENERAL PHARMACY W/ HCPCS (ALT 636 FOR OP/ED): Performed by: STUDENT IN AN ORGANIZED HEALTH CARE EDUCATION/TRAINING PROGRAM

## 2023-11-25 PROCEDURE — 80053 COMPREHEN METABOLIC PANEL: CPT

## 2023-11-25 PROCEDURE — 93975 VASCULAR STUDY: CPT | Performed by: RADIOLOGY

## 2023-11-25 PROCEDURE — 83993 ASSAY FOR CALPROTECTIN FECAL: CPT

## 2023-11-25 PROCEDURE — 87506 IADNA-DNA/RNA PROBE TQ 6-11: CPT

## 2023-11-25 PROCEDURE — 93970 EXTREMITY STUDY: CPT

## 2023-11-25 PROCEDURE — 83630 LACTOFERRIN FECAL (QUAL): CPT

## 2023-11-25 PROCEDURE — 83036 HEMOGLOBIN GLYCOSYLATED A1C: CPT

## 2023-11-25 PROCEDURE — 2500000001 HC RX 250 WO HCPCS SELF ADMINISTERED DRUGS (ALT 637 FOR MEDICARE OP)

## 2023-11-25 PROCEDURE — 76700 US EXAM ABDOM COMPLETE: CPT | Performed by: RADIOLOGY

## 2023-11-25 PROCEDURE — S4991 NICOTINE PATCH NONLEGEND: HCPCS

## 2023-11-25 PROCEDURE — 2500000004 HC RX 250 GENERAL PHARMACY W/ HCPCS (ALT 636 FOR OP/ED)

## 2023-11-25 PROCEDURE — 81001 URINALYSIS AUTO W/SCOPE: CPT

## 2023-11-25 PROCEDURE — 93971 EXTREMITY STUDY: CPT | Performed by: RADIOLOGY

## 2023-11-25 PROCEDURE — 83735 ASSAY OF MAGNESIUM: CPT

## 2023-11-25 PROCEDURE — 93975 VASCULAR STUDY: CPT | Mod: 59

## 2023-11-25 PROCEDURE — 93306 TTE W/DOPPLER COMPLETE: CPT

## 2023-11-25 PROCEDURE — 80349 CANNABINOIDS NATURAL: CPT

## 2023-11-25 PROCEDURE — 1100000001 HC PRIVATE ROOM DAILY

## 2023-11-25 PROCEDURE — 2500000002 HC RX 250 W HCPCS SELF ADMINISTERED DRUGS (ALT 637 FOR MEDICARE OP, ALT 636 FOR OP/ED)

## 2023-11-25 PROCEDURE — 85045 AUTOMATED RETICULOCYTE COUNT: CPT

## 2023-11-25 PROCEDURE — 85025 COMPLETE CBC W/AUTO DIFF WBC: CPT

## 2023-11-25 PROCEDURE — 76705 ECHO EXAM OF ABDOMEN: CPT

## 2023-11-25 RX ORDER — POTASSIUM CHLORIDE 20 MEQ/1
40 TABLET, EXTENDED RELEASE ORAL ONCE
Status: COMPLETED | OUTPATIENT
Start: 2023-11-25 | End: 2023-11-25

## 2023-11-25 RX ORDER — PANTOPRAZOLE SODIUM 40 MG/1
40 TABLET, DELAYED RELEASE ORAL
Status: DISCONTINUED | OUTPATIENT
Start: 2023-11-26 | End: 2023-12-01 | Stop reason: HOSPADM

## 2023-11-25 RX ORDER — FUROSEMIDE 10 MG/ML
40 INJECTION INTRAMUSCULAR; INTRAVENOUS ONCE
Status: COMPLETED | OUTPATIENT
Start: 2023-11-25 | End: 2023-11-25

## 2023-11-25 RX ORDER — MAGNESIUM SULFATE HEPTAHYDRATE 40 MG/ML
2 INJECTION, SOLUTION INTRAVENOUS ONCE
Status: COMPLETED | OUTPATIENT
Start: 2023-11-25 | End: 2023-11-25

## 2023-11-25 RX ORDER — IBUPROFEN 200 MG
1 TABLET ORAL DAILY
Status: DISCONTINUED | OUTPATIENT
Start: 2023-11-25 | End: 2023-12-01 | Stop reason: HOSPADM

## 2023-11-25 RX ORDER — LACTULOSE 10 G/15ML
20 SOLUTION ORAL DAILY
Status: DISCONTINUED | OUTPATIENT
Start: 2023-11-25 | End: 2023-12-01 | Stop reason: HOSPADM

## 2023-11-25 RX ADMIN — POTASSIUM CHLORIDE 40 MEQ: 1500 TABLET, EXTENDED RELEASE ORAL at 05:03

## 2023-11-25 RX ADMIN — ACETAMINOPHEN 650 MG: 325 TABLET ORAL at 09:23

## 2023-11-25 RX ADMIN — RISPERIDONE 1 MG: 1 TABLET ORAL at 20:09

## 2023-11-25 RX ADMIN — ACETAMINOPHEN 650 MG: 325 TABLET ORAL at 09:24

## 2023-11-25 RX ADMIN — LACTULOSE 20 G: 20 SOLUTION ORAL at 12:18

## 2023-11-25 RX ADMIN — Medication 1 TABLET: at 09:25

## 2023-11-25 RX ADMIN — PERFLUTREN 10 ML OF DILUTION: 6.52 INJECTION, SUSPENSION INTRAVENOUS at 11:31

## 2023-11-25 RX ADMIN — LORAZEPAM 0.5 MG: 0.5 TABLET ORAL at 15:08

## 2023-11-25 RX ADMIN — FOLIC ACID 1 MG: 1 TABLET ORAL at 09:25

## 2023-11-25 RX ADMIN — Medication 1 PATCH: at 09:59

## 2023-11-25 RX ADMIN — THIAMINE HYDROCHLORIDE 200 MG: 100 INJECTION, SOLUTION INTRAMUSCULAR; INTRAVENOUS at 12:18

## 2023-11-25 RX ADMIN — MAGNESIUM SULFATE HEPTAHYDRATE 2 G: 40 INJECTION, SOLUTION INTRAVENOUS at 09:22

## 2023-11-25 RX ADMIN — RISPERIDONE 1 MG: 1 TABLET ORAL at 09:25

## 2023-11-25 RX ADMIN — HEPARIN SODIUM 9 UNITS/HR: 10000 INJECTION, SOLUTION INTRAVENOUS at 17:42

## 2023-11-25 RX ADMIN — ACETAMINOPHEN 650 MG: 325 TABLET ORAL at 20:08

## 2023-11-25 RX ADMIN — FUROSEMIDE 40 MG: 10 INJECTION, SOLUTION INTRAMUSCULAR; INTRAVENOUS at 09:25

## 2023-11-25 ASSESSMENT — LIFESTYLE VARIABLES
AGITATION: NORMAL ACTIVITY
TREMOR: NO TREMOR
TOTAL SCORE: 0
PAROXYSMAL SWEATS: NO SWEAT VISIBLE
NAUSEA AND VOMITING: NO NAUSEA AND NO VOMITING
ORIENTATION AND CLOUDING OF SENSORIUM: ORIENTED AND CAN DO SERIAL ADDITIONS
HEADACHE, FULLNESS IN HEAD: NOT PRESENT
NAUSEA AND VOMITING: NO NAUSEA AND NO VOMITING
AUDITORY DISTURBANCES: NOT PRESENT
TOTAL SCORE: 0
NAUSEA AND VOMITING: NO NAUSEA AND NO VOMITING
AGITATION: NORMAL ACTIVITY
AUDITORY DISTURBANCES: NOT PRESENT
AGITATION: NORMAL ACTIVITY
TREMOR: NO TREMOR
ANXIETY: NO ANXIETY, AT EASE
HEADACHE, FULLNESS IN HEAD: NOT PRESENT
ORIENTATION AND CLOUDING OF SENSORIUM: ORIENTED AND CAN DO SERIAL ADDITIONS
AUDITORY DISTURBANCES: NOT PRESENT
VISUAL DISTURBANCES: NOT PRESENT
HEADACHE, FULLNESS IN HEAD: NOT PRESENT
ORIENTATION AND CLOUDING OF SENSORIUM: ORIENTED AND CAN DO SERIAL ADDITIONS
TREMOR: NO TREMOR
AUDITORY DISTURBANCES: NOT PRESENT
HEADACHE, FULLNESS IN HEAD: NOT PRESENT
VISUAL DISTURBANCES: NOT PRESENT
ANXIETY: NO ANXIETY, AT EASE
ORIENTATION AND CLOUDING OF SENSORIUM: ORIENTED AND CAN DO SERIAL ADDITIONS
VISUAL DISTURBANCES: NOT PRESENT
PAROXYSMAL SWEATS: NO SWEAT VISIBLE
PULSE: 112
AUDITORY DISTURBANCES: NOT PRESENT
BLOOD PRESSURE: 125/89
ORIENTATION AND CLOUDING OF SENSORIUM: ORIENTED AND CAN DO SERIAL ADDITIONS
ORIENTATION AND CLOUDING OF SENSORIUM: ORIENTED AND CAN DO SERIAL ADDITIONS
AGITATION: NORMAL ACTIVITY
VISUAL DISTURBANCES: NOT PRESENT
TOTAL SCORE: 0
PULSE: 99
HEADACHE, FULLNESS IN HEAD: NOT PRESENT
PAROXYSMAL SWEATS: NO SWEAT VISIBLE
HEADACHE, FULLNESS IN HEAD: NOT PRESENT
TREMOR: NO TREMOR
TOTAL SCORE: 0
ORIENTATION AND CLOUDING OF SENSORIUM: ORIENTED AND CAN DO SERIAL ADDITIONS
TREMOR: NO TREMOR
PAROXYSMAL SWEATS: NO SWEAT VISIBLE
ORIENTATION AND CLOUDING OF SENSORIUM: ORIENTED AND CAN DO SERIAL ADDITIONS
HEADACHE, FULLNESS IN HEAD: NOT PRESENT
TREMOR: NO TREMOR
HEADACHE, FULLNESS IN HEAD: NOT PRESENT
PAROXYSMAL SWEATS: NO SWEAT VISIBLE
TOTAL SCORE: 0
TOTAL SCORE: 0
ORIENTATION AND CLOUDING OF SENSORIUM: ORIENTED AND CAN DO SERIAL ADDITIONS
NAUSEA AND VOMITING: NO NAUSEA AND NO VOMITING
TOTAL SCORE: 0
NAUSEA AND VOMITING: NO NAUSEA AND NO VOMITING
ANXIETY: NO ANXIETY, AT EASE
PAROXYSMAL SWEATS: NO SWEAT VISIBLE
PAROXYSMAL SWEATS: NO SWEAT VISIBLE
NAUSEA AND VOMITING: NO NAUSEA AND NO VOMITING
ANXIETY: NO ANXIETY, AT EASE
AUDITORY DISTURBANCES: NOT PRESENT
AGITATION: NORMAL ACTIVITY
NAUSEA AND VOMITING: NO NAUSEA AND NO VOMITING
PAROXYSMAL SWEATS: NO SWEAT VISIBLE
TOTAL SCORE: 0
TREMOR: NO TREMOR
HEADACHE, FULLNESS IN HEAD: NOT PRESENT
BLOOD PRESSURE: 123/85
VISUAL DISTURBANCES: NOT PRESENT
AGITATION: NORMAL ACTIVITY
AUDITORY DISTURBANCES: NOT PRESENT
PULSE: 116
ANXIETY: NO ANXIETY, AT EASE
VISUAL DISTURBANCES: NOT PRESENT
TREMOR: NO TREMOR
PAROXYSMAL SWEATS: NO SWEAT VISIBLE
PULSE: 96
BLOOD PRESSURE: 127/80
ANXIETY: NO ANXIETY, AT EASE
PAROXYSMAL SWEATS: NO SWEAT VISIBLE
NAUSEA AND VOMITING: NO NAUSEA AND NO VOMITING
TOTAL SCORE: 0
VISUAL DISTURBANCES: NOT PRESENT
AGITATION: NORMAL ACTIVITY
AUDITORY DISTURBANCES: NOT PRESENT
AGITATION: NORMAL ACTIVITY
AGITATION: NORMAL ACTIVITY
TREMOR: NO TREMOR
ANXIETY: NO ANXIETY, AT EASE
BLOOD PRESSURE: 115/80
VISUAL DISTURBANCES: NOT PRESENT
ORIENTATION AND CLOUDING OF SENSORIUM: ORIENTED AND CAN DO SERIAL ADDITIONS
NAUSEA AND VOMITING: NO NAUSEA AND NO VOMITING
VISUAL DISTURBANCES: NOT PRESENT
NAUSEA AND VOMITING: NO NAUSEA AND NO VOMITING
TOTAL SCORE: 0
VISUAL DISTURBANCES: NOT PRESENT
ANXIETY: NO ANXIETY, AT EASE
TREMOR: NO TREMOR
HEADACHE, FULLNESS IN HEAD: NOT PRESENT
AGITATION: NORMAL ACTIVITY
AUDITORY DISTURBANCES: NOT PRESENT
ANXIETY: NO ANXIETY, AT EASE
AUDITORY DISTURBANCES: NOT PRESENT
ANXIETY: NO ANXIETY, AT EASE

## 2023-11-25 ASSESSMENT — COGNITIVE AND FUNCTIONAL STATUS - GENERAL
DAILY ACTIVITIY SCORE: 24
DAILY ACTIVITIY SCORE: 24
PATIENT BASELINE BEDBOUND: NO
MOBILITY SCORE: 24
MOBILITY SCORE: 24

## 2023-11-25 ASSESSMENT — ACTIVITIES OF DAILY LIVING (ADL)
HEARING - RIGHT EAR: FUNCTIONAL
TOILETING: INDEPENDENT
ADEQUATE_TO_COMPLETE_ADL: YES
BATHING: INDEPENDENT
DRESSING YOURSELF: INDEPENDENT
FEEDING YOURSELF: INDEPENDENT
WALKS IN HOME: INDEPENDENT
ADEQUATE_TO_COMPLETE_ADL: YES
PATIENT'S MEMORY ADEQUATE TO SAFELY COMPLETE DAILY ACTIVITIES?: YES
GROOMING: INDEPENDENT
PATIENT'S MEMORY ADEQUATE TO SAFELY COMPLETE DAILY ACTIVITIES?: YES
JUDGMENT_ADEQUATE_SAFELY_COMPLETE_DAILY_ACTIVITIES: YES
BATHING: INDEPENDENT
HEARING - LEFT EAR: FUNCTIONAL
JUDGMENT_ADEQUATE_SAFELY_COMPLETE_DAILY_ACTIVITIES: YES
TOILETING: INDEPENDENT
WALKS IN HOME: INDEPENDENT
HEARING - LEFT EAR: FUNCTIONAL
GROOMING: INDEPENDENT
DRESSING YOURSELF: INDEPENDENT
FEEDING YOURSELF: INDEPENDENT
HEARING - RIGHT EAR: FUNCTIONAL

## 2023-11-25 ASSESSMENT — PAIN DESCRIPTION - LOCATION
LOCATION: BACK
LOCATION: BACK
LOCATION: FOOT

## 2023-11-25 ASSESSMENT — PAIN SCALES - GENERAL
PAINLEVEL_OUTOF10: 8
PAINLEVEL_OUTOF10: 0 - NO PAIN

## 2023-11-25 NOTE — PROGRESS NOTES
"Junito Rodríguez is a 62 y.o. male on day 1 of admission presenting with Acute pulmonary embolism without acute cor pulmonale, unspecified pulmonary embolism type (CMS/HCC).    Subjective   No acute events overnight. Patient denied, nausea, vomiting, fever, and cough. Attempted oral intake this AM.          Objective     Physical Exam  Constitutional: Well-developed male   HEENT: Normocephalic, atraumatic. PERRL. EOMI. No cervical lymphadenopathy.  Respiratory: CTA bilaterally. No wheezes, rales, or rhonchi. Normal respiratory effort.  Cardiovascular: RRR. No murmurs, gallops, or rubs. No JVD. Radial pulses 2+.  Abdominal: Visibly distended abdomen with significant ascites, positive for transmitted thrill sign. Bowel sounds present. No hepatosplenomegaly or masses. Back pain present  Neuro: CN II-XII intact. UE and LE strength 5/5 bilaterally and sensation intact. Normal FTN testing.  MSK: 3+ pitting edema lower extremity edema up to the knee .  Skin: Warm, dry. No rashes or wounds.  Psych: Appropriate mood and affect.  Last Recorded Vitals  Blood pressure 123/85, pulse (!) 116, temperature 35.7 °C (96.3 °F), temperature source Temporal, resp. rate 18, height 1.753 m (5' 9\"), weight 49.9 kg (110 lb), SpO2 96 %.  Intake/Output last 3 Shifts:  I/O last 3 completed shifts:  In: 155.6 (3.1 mL/kg) [I.V.:155.6 (3.1 mL/kg)]  Out: 150 (3 mL/kg) [Urine:150 (0.1 mL/kg/hr)]  Weight: 49.9 kg     Relevant Results                Active Medications  Scheduled medications  fluticasone furoate-vilanteroL, 1 puff, inhalation, Daily  folic acid, 1 mg, oral, Daily  lactulose, 20 g, oral, Daily  multivitamin with minerals, 1 tablet, oral, Daily  nicotine, 1 patch, transdermal, Daily  [START ON 11/26/2023] pantoprazole, 40 mg, oral, Daily before breakfast  perflutren protein A microsphere, 0.5 mL, intravenous, Once in imaging  risperiDONE, 1 mg, oral, BID  sulfur hexafluoride microsphr, 2 mL, intravenous, Once in imaging  thiamine, 200 mg, " intravenous, Daily      Continuous medications  heparin, 0-4,500 Units/hr, Last Rate: 900 Units/hr (11/25/23 0350)      PRN medications  PRN medications: acetaminophen **OR** acetaminophen **OR** acetaminophen, acetaminophen, albuterol, heparin, LORazepam **OR** LORazepam **OR** LORazepam     Recent Labs  Results for orders placed or performed during the hospital encounter of 11/24/23 (from the past 24 hour(s))   Sars-CoV-2 and Influenza A/B PCR   Result Value Ref Range    Flu A Result Not Detected Not Detected    Flu B Result Not Detected Not Detected    Coronavirus 2019, PCR Not Detected Not Detected   Blood Gas Venous Full Panel Unsolicited   Result Value Ref Range    POCT pH, Venous 7.49 (H) 7.33 - 7.43 pH    POCT pCO2, Venous 47 41 - 51 mm Hg    POCT pO2, Venous 35 35 - 45 mm Hg    POCT SO2, Venous 45 45 - 75 %    POCT Oxy Hemoglobin, Venous 43.5 (L) 45.0 - 75.0 %    POCT Hematocrit Calculated, Venous 30.0 (L) 41.0 - 52.0 %    POCT Sodium, Venous 132 (L) 136 - 145 mmol/L    POCT Potassium, Venous 4.2 3.5 - 5.3 mmol/L    POCT Chloride, Venous 98 98 - 107 mmol/L    POCT Ionized Calicum, Venous 1.01 (L) 1.10 - 1.33 mmol/L    POCT Glucose, Venous 120 (H) 74 - 99 mg/dL    POCT Lactate, Venous 2.0 0.4 - 2.0 mmol/L    POCT Base Excess, Venous 11.2 (H) -2.0 - 3.0 mmol/L    POCT HCO3 Calculated, Venous 35.8 (H) 22.0 - 26.0 mmol/L    POCT Hemoglobin, Venous 10.1 (L) 13.5 - 17.5 g/dL    POCT Anion Gap, Venous 2.0 (L) 10.0 - 25.0 mmol/L    Patient Temperature 37.0 degrees Celsius   Lactate   Result Value Ref Range    Lactate 2.4 (H) 0.4 - 2.0 mmol/L   Protime-INR   Result Value Ref Range    Protime 14.1 (H) 9.8 - 12.8 seconds    INR 1.3 (H) 0.9 - 1.1   aPTT   Result Value Ref Range    aPTT 35 27 - 38 seconds   Lactate   Result Value Ref Range    Lactate 2.8 (H) 0.4 - 2.0 mmol/L   Heparin Assay, UFH   Result Value Ref Range    Heparin Unfractionated 1.6 (HH) See Comment Below for Therapeutic Ranges IU/mL   Hepatitis panel,  acute   Result Value Ref Range    Hepatitis B Surface AG Nonreactive Nonreactive    Hepatitis A  AB- IgM Nonreactive Nonreactive    Hepatitis B Core AB; IgM Nonreactive Nonreactive    Hepatitis C AB Nonreactive Nonreactive   Renal function panel   Result Value Ref Range    Glucose 111 (H) 74 - 99 mg/dL    Sodium 136 136 - 145 mmol/L    Potassium 3.7 3.5 - 5.3 mmol/L    Chloride 98 98 - 107 mmol/L    Bicarbonate 30 21 - 32 mmol/L    Anion Gap 12 10 - 20 mmol/L    Urea Nitrogen 6 6 - 23 mg/dL    Creatinine 0.78 0.50 - 1.30 mg/dL    eGFR >90 >60 mL/min/1.73m*2    Calcium 7.0 (L) 8.6 - 10.6 mg/dL    Phosphorus 3.5 2.5 - 4.9 mg/dL    Albumin 1.8 (L) 3.4 - 5.0 g/dL   Folate   Result Value Ref Range    Folate, Serum 23.4 >5.0 ng/mL   Vitamin B12   Result Value Ref Range    Vitamin B12 1,349 (H) 211 - 911 pg/mL   Heparin Assay, UFH   Result Value Ref Range    Heparin Unfractionated 1.0 See Comment Below for Therapeutic Ranges IU/mL   Lactate   Result Value Ref Range    Lactate 1.7 0.4 - 2.0 mmol/L   CBC and Auto Differential   Result Value Ref Range    WBC 8.9 4.4 - 11.3 x10*3/uL    nRBC 0.0 0.0 - 0.0 /100 WBCs    RBC 2.47 (L) 4.50 - 5.90 x10*6/uL    Hemoglobin 8.8 (L) 13.5 - 17.5 g/dL    Hematocrit 25.5 (L) 41.0 - 52.0 %     (H) 80 - 100 fL    MCH 35.6 (H) 26.0 - 34.0 pg    MCHC 34.5 32.0 - 36.0 g/dL    RDW 16.9 (H) 11.5 - 14.5 %    Platelets 118 (L) 150 - 450 x10*3/uL    Neutrophils % 56.5 40.0 - 80.0 %    Immature Granulocytes %, Automated 0.3 0.0 - 0.9 %    Lymphocytes % 35.7 13.0 - 44.0 %    Monocytes % 7.0 2.0 - 10.0 %    Eosinophils % 0.2 0.0 - 6.0 %    Basophils % 0.3 0.0 - 2.0 %    Neutrophils Absolute 5.00 1.20 - 7.70 x10*3/uL    Immature Granulocytes Absolute, Automated 0.03 0.00 - 0.70 x10*3/uL    Lymphocytes Absolute 3.17 1.20 - 4.80 x10*3/uL    Monocytes Absolute 0.62 0.10 - 1.00 x10*3/uL    Eosinophils Absolute 0.02 0.00 - 0.70 x10*3/uL    Basophils Absolute 0.03 0.00 - 0.10 x10*3/uL   Comprehensive  Metabolic Panel   Result Value Ref Range    Glucose 103 (H) 74 - 99 mg/dL    Sodium 137 136 - 145 mmol/L    Potassium 3.8 3.5 - 5.3 mmol/L    Chloride 100 98 - 107 mmol/L    Bicarbonate 32 21 - 32 mmol/L    Anion Gap 9 (L) 10 - 20 mmol/L    Urea Nitrogen 6 6 - 23 mg/dL    Creatinine 0.76 0.50 - 1.30 mg/dL    eGFR >90 >60 mL/min/1.73m*2    Calcium 6.9 (L) 8.6 - 10.6 mg/dL    Albumin 1.8 (L) 3.4 - 5.0 g/dL    Alkaline Phosphatase 96 33 - 136 U/L    Total Protein 4.5 (L) 6.4 - 8.2 g/dL    AST 33 9 - 39 U/L    Bilirubin, Total 1.1 0.0 - 1.2 mg/dL    ALT 15 10 - 52 U/L   Magnesium   Result Value Ref Range    Magnesium 1.60 1.60 - 2.40 mg/dL   Phosphorus   Result Value Ref Range    Phosphorus 3.3 2.5 - 4.9 mg/dL   Heparin Assay, UFH   Result Value Ref Range    Heparin Unfractionated 0.6 See Comment Below for Therapeutic Ranges IU/mL   Heparin Assay, UFH   Result Value Ref Range    Heparin Unfractionated 0.4 See Comment Below for Therapeutic Ranges IU/mL   Transthoracic Echo (TTE) Complete   Result Value Ref Range    BSA 1.56 m2       Imaging  CT abdomen pelvis w IV contrast    Result Date: 11/24/2023  Interpreted By:  Sammy Hernandez,  and Hans Boone STUDY: CT ABDOMEN PELVIS W IV CONTRAST;  11/24/2023 1:39 pm   INDICATION: Signs/Symptoms:not tolerating PO, no BM, r/o OBS.   COMPARISON: CT abdomen and pelvis with contrast 08/09/2013.   ACCESSION NUMBER(S): [id number]   ORDERING CLINICIAN: MUMTAZ KEY   TECHNIQUE: CT of the abdomen and pelvis was performed.  Standard contiguous axial images were obtained at 3 mm slice thickness through the abdomen and pelvis. Coronal and sagittal reconstructions at 3 mm slice thickness were performed.   75 ml of contrast Omnipaque 350 administered intravenously without immediate complication.   FINDINGS: LOWER CHEST: Ground-glass opacities in the in the middle and posterior basal segments of the right lower lobe. Small right-sided pleural effusion. There is a equivocal  filling defect in the segmental branch of the right lower lobe pulmonary artery (series 201, image 1 and series 205, image 1) raising concern for pulmonary embolism. The heart is normal in size without pericardial effusion. Visualized distal esophagus appears normal. Small hiatal hernia is noted.   ABDOMEN:   LIVER: Liver demonstrates slightly irregular nodular outline with the volume redistribution and posterior notching of the segment 6. No focal liver lesion is noted.   BILE DUCTS: The intrahepatic and extrahepatic ducts are not dilated.   GALLBLADDER: The gallbladder is nondistended and without evidence of radiopaque stones.   PANCREAS: The pancreas appears unremarkable.   SPLEEN: The spleen is normal in size.   ADRENAL GLANDS: Bilateral adrenal glands appear normal.   KIDNEYS AND URETERS: The kidneys are normal in size and enhance symmetrically.  No hydroureteronephrosis or nephroureterolithiasis is identified.   PELVIS:   BLADDER: The urinary bladder appears normal without abnormal wall thickening.   REPRODUCTIVE ORGANS: The prostate is not enlarged.   BOWEL: Stomach is decompressed and limited for evaluation. There are few ileal loops in the mid abdomen which demonstrate decreased enhancement of the wall compared to the rest of the bowel loops (series 201, image 75, 89). There is a mild thickening and edema of a some of the distal ileal loops as well as the cecum, ascending colon, distal descending colon and the proximal sigmoid colon (series 201, image 73). Small and large bowel are normal in caliber. The appendix appears normal.   VESSELS: No obvious occlusion of the celiac or SMA within limitations of current examination. Diffuse atherosclerotic calcification of the abdominal aorta and its distal branches. No evidence of thrombus within the IVC or SMV.   PERITONEUM/RETROPERITONEUM/LYMPH NODES: Large volume abdominopelvic ascites. No evidence of free air or loculated fluid collections. No enlarged  mesenteric lymph nodes.   BONES AND ABDOMINAL WALL: No suspicious osseous lesions are identified.  The abdominal wall soft tissues appear normal.       1. Ground-glass opacities in the right middle and right lower lobe with associated small right pleural effusion likely infectious or inflammatory in etiology. Equivocal filling defect in a right lower lobe segmental pulmonary arterial branch raising concern for pulmonary embolism. Examination not optimized for evaluation of pulmonary embolism. Recommend CT PE protocol for further evaluation to rule out pulmonary embolism. 2. Decreased enhancement of a few of the ileal loops in the mid abdomen Thickened edematous distal small bowel loops as well as several segments of the colon which can be infectious or inflammatory enterocolitis. Given the decreased enhancement of some of the ileal loops, possibility of ischemic enterocolitis can also be considered in the differential. Normal enhancement of the aorta and its major branches. No evidence of pneumatosis of the bowel loops. Correlation with the laboratory abnormalities is recommended. 3. Diffuse atherosclerotic calcification of the abdominal aorta and its distal branches. No apparent thrombus within the IVC or SMV. 4. Large volume abdominopelvic ascites. 5. Subtle nodularity of the liver which can be seen with liver parenchymal disease. Correlate with liver function tests.   I personally reviewed the images/study and I agree with the findings as stated by resident Paolo Maxwell. This study was interpreted at [facility], [place], [place].   MACRO: Resident, Dr. Paolo Maxwell discussed the significance and urgency of this critical finding by telephone with resident Dr. Paolo Higgins on 11/24/2023 at 2:38 pm.  (**-RCF-**) Findings:  See findings.   Signed by: Sammy Hernandez 11/24/2023 4:44 PM Dictation workstation:   [id number]    CT angio chest for pulmonary  embolism    Result Date: 11/24/2023  Interpreted By:  Bob Ruggiero and Afshari Mirak William STUDY: CT ANGIO CHEST FOR PULMONARY EMBOLISM;  11/24/2023 3:24 pm   INDICATION: Signs/Symptoms:SOB, incidental finding on CT abd/pelvis concerning for PE.   COMPARISON: CT abdomen pelvis same day. CT chest 04/05/2022.   ACCESSION NUMBER(S): [id number]   ORDERING CLINICIAN: RAND LAM   TECHNIQUE: Helical data acquisition of the chest was obtained after intravenous administration of 61 mL of Omnipaque 350, as per PE protocol. Images were reformatted in coronal and sagittal planes. Axial and coronal maximum intensity projection (MIP) images were created and reviewed.   FINDINGS: POTENTIAL LIMITATIONS OF THE STUDY: None   HEART AND VESSELS: Right IJ approach central venous catheter tip projects over superior cavoatrial junction. Multiple filling defects involving the right middle lobar as well as medial and lateral segmental arteries as seen on series 401, image 158-168 and additional filling defects involving the posterior and medial basal segmental arteries as seen on series 401, image 192 and series 403, image 88. There is a filling defect within superior segmental artery of the left lung as seen on series 403, image 94. Main pulmonary artery and its branches are normal in caliber.   The thoracic aorta normal in course and caliber.No evidence of significant atherosclerotic calcification of the thoracic aorta. Moderate coronary artery calcifications are seen. Please note,the study is not optimized for evaluation of coronary arteries.   The cardiac chambers are not enlarged.There are no findings to suggest right heart strain.   Trace pericardial effusion   MEDIASTINUM AND HANS, LOWER NECK AND AXILLA: The visualized thyroid gland is within normal limits. No evidence of thoracic lymphadenopathy by CT criteria. There is a small to moderate hiatal hernia. Otherwise, esophagus is unremarkable.   LUNGS AND AIRWAYS: The  trachea and central airways are patent. No endobronchial lesion is seen.   Small bilateral pleural effusions with consolidative opacities of the adjacent lung parenchyma. There are moderate emphysematous changes of the bilateral lungs with areas of subpleural reticular and ground-glass opacities which are more prominent when compared to the prior exam and likely represent smoking related interstitial lung disease. There are ground-glass opacities in the right middle and upper lobes as seen on series 402 image 176 and 158.   UPPER ABDOMEN: Large volume of ascites is partially visualized. Additional abdominal findings are better characterized on the same day CT abdomen and pelvis.     CHEST WALL AND OSSEOUS STRUCTURES: Chest wall is within normal limits. No acute osseous pathology.There are no suspicious osseous lesions.There are multilevel degenerative changes within visualized spine.       1. Multiple bilateral pulmonary emboli, right-greater-than-left with the largest embolism located in the right middle lobar artery. Additional involvement of the right upper and lower lobes and segmental/subsegmental involvement in the left lower lobe. 2. There are consolidative opacities in the dependent portions of the bilateral lungs, right-greater-than-left which are nonspecific and may represent atelectasis. In the setting of pulmonary embolism, a developing parenchymal infarct cannot be excluded. Additional ground-glass opacities in the right middle and upper lobes which are nonspecific and may represent additional foci of ischemic related parenchymal changes. Pulmonary edema or multifocal infection are differential considerations. 3. Redemonstration of smoking related interstitial lung disease. 4. Abdominopelvic ascites and additional abdominal findings are better characterized on same day CT abdomen and pelvis.   I personally reviewed the images/study and I agree with the findings as stated by resident physician Dr. Thomas  Sondra Harris . This study was interpreted at [facility], [place], [place].   MACRO: William Harris discussed the significance and urgency of this critical finding by secure chat with  RAND LAM on 11/24/2023 at 3:58 pm.  (**-RCF-**) Findings:  See findings.   Signed by: Bob Ruggiero 11/24/2023 4:11 PM Dictation workstation:   [id number]                    Assessment/Plan   Principal Problem:    Acute pulmonary embolism without acute cor pulmonale, unspecified pulmonary embolism type (CMS/HCC)      Junito Rodríguez is a 62 y.o. male with past medical history of depression, anxiety, COPD(PFTs not on file), alcohol abuse disorder, significant tobacco use, and chronic lower back pain.  Patient presented to the ED with 7-day history of leg swelling,Abdominal swelling, shortness of breath, and anorexia.  Was found to have significant ascites and bilateral lower extremity edema on exam.  Imaging was significant for bilateral PE's and parenchymal liver disease (subtle nodularity) likely alcoholic cirrhosis.     #Bilateral PE's  #Dyspnea on exertion  :: Evident on CT PE  ::Multiple bilateral pulmonary emboli, right-greater-than-left with  the largest embolism located in the right middle lobar artery.  Additional involvement of the right upper and lower lobes and  segmental/subsegmental involvement in the left lower lobe.  :: In the setting of lower extremity swelling highly likely due to DVTs  :: Trop:30 Lactate:2.4  BNP: 54  -Started heparin gtt for anticoagulation   -Echo ordered and pending   -DVT ultrasound lower extremities ordered     #Massive ascites  :: Imaging showing parenchymal disease  :: Labs showing AST over ALT value greater than 2 with AST 43 and ALT  20  :: INR elevated at 1.3  :: Albumin decreased at 1.8  :: Normal bilirubin elevated alk phos  :: Likely in the setting of alcoholic cirrhosis versus right heart failure from bilateral PEs however unlikely given low  BNP.  :: Meld Na score 10  -Hepatitis panel pending  -CA 19, CA, AFP pending  -Drug screen pending  -UA pending  -US doppler of liver in AM   -CTX 1 g daily for SBP prophylaxis Held as patient does not have a history of SBP and does not have significant leukocytosis  - 40mg IV lasix today: will monitor if patient's ascites improves  - Plan for IR consult on Monday for Para nut will need to hold heparin for 4 hours pre and post procedure.  - Chronic liver panel sent (cerruloplasmin, Ferritin etc.) pending.  - Lactulose given     #Possible enterocolitis  #Weight loss, change in appetite  -s/p CTX 1gm in ED  -ordered stool pathogen PCR, lactoferrin, calprotectin  - can consider ceftriaxone and metronidazole if patient shows signs of clinical deterioration        #COPD  -Continue home albuterol and Breo Ellipta     #Depression  #Anxiety  -Continue home risperidone 1 mg twice daily     #Alcohol abuse  -[facility] protocol  -Folate supplementation and multivitamin  -Folate and B12 level.  -Patient consulted     #Chronic Tobacco use  - Nicotine patch      F: Status post 500 mL LR  E: k>4 Mg>2  N: Regular diet  GI ppx: Not indicated   DVT ppx: heparin gtt  Full code  Next of kin: Daughter (Karlee) : [phone]           Grady Rodriguez MD

## 2023-11-25 NOTE — CARE PLAN
The patient's goals for the shift include      The clinical goals for the shift include      Over the shift, the patient did not make progress toward the following goals. Barriers to progression include . Recommendations to address these barriers include   Problem: Fall/Injury  Goal: Not fall by end of shift  Outcome: Progressing  Goal: Be free from injury by end of the shift  Outcome: Progressing  Goal: Verbalize understanding of personal risk factors for fall in the hospital  Outcome: Progressing  Goal: Verbalize understanding of risk factor reduction measures to prevent injury from fall in the home  Outcome: Progressing  Goal: Use assistive devices by end of the shift  Outcome: Progressing  Goal: Pace activities to prevent fatigue by end of the shift  Outcome: Progressing     Problem: Pain  Goal: Takes deep breaths with improved pain control throughout the shift  Outcome: Progressing  Goal: Turns in bed with improved pain control throughout the shift  Outcome: Progressing  Goal: Walks with improved pain control throughout the shift  Outcome: Progressing  Goal: Performs ADL's with improved pain control throughout shift  Outcome: Progressing  Goal: Participates in PT with improved pain control throughout the shift  Outcome: Progressing  Goal: Free from opioid side effects throughout the shift  Outcome: Progressing  Goal: Free from acute confusion related to pain meds throughout the shift  Outcome: Progressing   .

## 2023-11-25 NOTE — SIGNIFICANT EVENT
Senior Staffing Note    HPI  Patient is a 62 year old male with past medical history of schizophrenia (compliant with daily medications), anxiety, and COPD, presenting with no appetite x8 days, diffuse abdominal pain and lower leg swelling. Patient reports that he has been chronically feeling like he has had a decreased appetite over the past few months, but for the last 8 days wasn't able to eat at all (other than water and gingerale) and endorses 30 pound weight loss in the last 3.5 months as well as his legs swelling up for the last 8 days to the point he couldn't move them or walk around.  Abdominal pain has been ongoing for 8 days, states his belly has been getting more descended and the pain has progressively worsening. Has not had a bowel movement in 8 days. Admits to not being able to tolerate PO diet, but has been able to have liquids, states he has been nauseous and had episode of vomiting. Also states that for the past few days he has been SOB and feels he is unable to catch his breath. There has been a cough with production of green sputum.     ROS: SOB, poor appetite, 3.5 months, lost 30 lbs due to decreased appetite (states since “certain things been going on in his life”, +PND, no orthopnea, passing gas, +erectile dysfunction, orthostatics symptoms, no change in normal activity (sits on couch, watching tv and eating; though doesn't like to go out because he reports he doesn't like being with people)    In the ED, /70 100 18 100% on room air  /2.9/97/33/5/0.87  CBC 10.5 <11.8/34>154  Troponin 30, BNP 54  Imaging:   CT abdomen/pelvis:   -Ground-glass opacities in the right middle and right lower lobe with associated small right pleural effusion likely infectious or inflammatory in etiology. Equivocal filling defect in a right lower lobe segmental pulmonary arterial branch raising concern for pulmonary embolism.   -Decreased enhancement of a few of the ileal loops in the mid abdomen,  Thickened edematous distal small bowel loops as well as several segments of the colon which can be infectious or inflammatory enterocolitis. Given the decreased enhancement of some of the ileal loops, possibility of ischemic enterocolitis can also be considered in the differential.  -Normal enhancement of the aorta and its major branches. No evidence of pneumatosis of the bowel loops.   -Diffuse atherosclerotic calcification of the abdominal aorta and its distal branches. No apparent thrombus within the IVC or SMV.  -Large volume abdominopelvic ascites.  -Subtle nodularity of the liver.    CT PE: bilateral PEs  -Multiple bilateral pulmonary emboli, right-greater-than-left with the largest embolism located in the right middle lobar artery. Additional involvement of the right upper and lower lobes and segmental/subsegmental involvement in the left lower lobe.   -There are consolidative opacities in the dependent portions of the bilateral lungs, right-greater-than-left which are nonspecific and may represent atelectasis. In the setting of pulmonary embolism, a developing parenchymal infarct cannot be excluded. Additional ground-glass opacities in the right middle and upper lobes which are nonspecific and may represent additional foci of ischemic related parenchymal changes. Pulmonary edema or multifocal infection are differential considerations.   -Redemonstration of smoking related interstitial lung disease.     Interventions: CTX 1g for SBP ppx    PMH: Colonoscopy three years ago with tubular adenomas  FHx: mom  of ovarian cancer; brother with schizophenira, sister with pacemaker   SH: smokes ½ pack a day, last drink, no marijuana    Medications: Albuterol, Advair, Mobeq for back pain, Risperidone 1mg BID, Mirtazapine   Allergies: none    Physical Exam:   General: conversational, in NAD  HEENT: atraumatic, dry mucous membranes, no jaundice  Neuro: A&o x3, no asterixis  Cards: normal rate, rhythm  Pulm:  +occasional cough, clear to auscultation, on room air   GI: distended, pain diffusely (with and without touch), no guarding  Extremities: 2+ pitting edema to the knees    Assessment/Plan  62 year old male with past medical history of schizophrenia (compliant with daily medications), anxiety, and COPD presenting with decreased appetite and distended abdomen with associated generalized pain and lower leg swelling with exam concerning for fluid overload, labs concerning for liver impairment with low electrolytes, and imaging significant for bilateral pulmonary embolisms and possible enterocolitis.    Ddx includes malignancy (smoking history, new bilateral pulmonary embolisms, weight loss though could also be associated with low activity and corresponding atelectasis), new onset cirrhosis possibly 2/2 prolonged alcohol use (AST 2x > ALT, new onset nodulatiry, with large volume ascites), enterocolitis without bowel obstruction or mesenteric ischemia (appetite change though somewhat chronic and imaging concerning for inflammation but still passing gas, only slightly elevated lactate 2.4; pain not associated with eating), possible SBP (though doesn't fit established criteria, could be consistent with enterocolitis given enteric dislocation of bacteria with SBP).     #New onset ascites, leg swelling (with ddx as above)  -TTE: assess EF; rule out new onset heart failure  -Consider diagnostic para once off anticoagulation; may need diuresis in AM once electrolytes stable  -Hepatitis panel   -Folate, thiamine    #Possible enterocolitis  #Weight loss, change in appetite  -s/p CTX 1gm in ED  -send for stool pathogen PCR, lactoferrin, calprotectin  [ ] consider outpatient endoscopy/colonoscopy     #bilateral pulmonary embolisms   -on room air  -continue heparin gtt    Labs:   [ ] BID RFPs (given need for diuresis on already baseline low electrolytes with possible refeeding when re-introducing feeds inpatient)- if requiring BID  repletion, consider TID labs  [ ] repeat lactate in AM    Dispo:   [ ] consider outpatient initiation of antidepressant, connect with outpatient psychiatry  [ ] enlist social work to address social barriers    GI ppx: none, can start pantoprazole 40mg  DVT ppx: heparin gtt    Code: FULL  NOK: Valeria (daughter) [phone]    Rest of note per Dr. Rasmussen. To be staffed with attending in AM.

## 2023-11-25 NOTE — CARE PLAN
Problem: Fall/Injury  Goal: Not fall by end of shift  Outcome: Progressing  Goal: Be free from injury by end of the shift  Outcome: Progressing  Goal: Verbalize understanding of personal risk factors for fall in the hospital  Outcome: Progressing  Goal: Verbalize understanding of risk factor reduction measures to prevent injury from fall in the home  Outcome: Progressing  Goal: Use assistive devices by end of the shift  Outcome: Progressing  Goal: Pace activities to prevent fatigue by end of the shift  Outcome: Progressing     Problem: Pain  Goal: Takes deep breaths with improved pain control throughout the shift  Outcome: Progressing  Goal: Turns in bed with improved pain control throughout the shift  Outcome: Progressing  Goal: Walks with improved pain control throughout the shift  Outcome: Progressing  Goal: Performs ADL's with improved pain control throughout shift  Outcome: Progressing  Goal: Participates in PT with improved pain control throughout the shift  Outcome: Progressing  Goal: Free from opioid side effects throughout the shift  Outcome: Progressing  Goal: Free from acute confusion related to pain meds throughout the shift  Outcome: Progressing   The patient's goals for the shift include      The clinical goals for the shift include Pt will mainain CIWA score below 5    Over the shift, the patient did not make progress toward the following goals. Barriers to progression include . Recommendations to address these barriers include .

## 2023-11-26 ENCOUNTER — APPOINTMENT (OUTPATIENT)
Dept: CARDIOLOGY | Facility: HOSPITAL | Age: 63
DRG: 175 | End: 2023-11-26
Payer: MEDICARE

## 2023-11-26 LAB
ALBUMIN SERPL BCP-MCNC: 1.7 G/DL (ref 3.4–5)
ALP SERPL-CCNC: 101 U/L (ref 33–136)
ALT SERPL W P-5'-P-CCNC: 14 U/L (ref 10–52)
ANION GAP SERPL CALC-SCNC: 11 MMOL/L (ref 10–20)
AST SERPL W P-5'-P-CCNC: 27 U/L (ref 9–39)
ATRIAL RATE: 258 BPM
BASOPHILS # BLD AUTO: 0.03 X10*3/UL (ref 0–0.1)
BASOPHILS NFR BLD AUTO: 0.4 %
BILIRUB SERPL-MCNC: 0.6 MG/DL (ref 0–1.2)
BUN SERPL-MCNC: 6 MG/DL (ref 6–23)
C COLI+JEJ+UPSA DNA STL QL NAA+PROBE: NOT DETECTED
CALCIUM SERPL-MCNC: 7.1 MG/DL (ref 8.6–10.6)
CHLORIDE SERPL-SCNC: 99 MMOL/L (ref 98–107)
CO2 SERPL-SCNC: 29 MMOL/L (ref 21–32)
CREAT SERPL-MCNC: 0.76 MG/DL (ref 0.5–1.3)
EC STX1 GENE STL QL NAA+PROBE: NOT DETECTED
EC STX2 GENE STL QL NAA+PROBE: NOT DETECTED
EOSINOPHIL # BLD AUTO: 0.02 X10*3/UL (ref 0–0.7)
EOSINOPHIL NFR BLD AUTO: 0.2 %
ERYTHROCYTE [DISTWIDTH] IN BLOOD BY AUTOMATED COUNT: 17.8 % (ref 11.5–14.5)
GFR SERPL CREATININE-BSD FRML MDRD: >90 ML/MIN/1.73M*2
GLUCOSE SERPL-MCNC: 93 MG/DL (ref 74–99)
HCT VFR BLD AUTO: 26.9 % (ref 41–52)
HGB BLD-MCNC: 8.7 G/DL (ref 13.5–17.5)
IMM GRANULOCYTES # BLD AUTO: 0.04 X10*3/UL (ref 0–0.7)
IMM GRANULOCYTES NFR BLD AUTO: 0.5 % (ref 0–0.9)
INR PPP: 1.1 (ref 0.9–1.1)
LYMPHOCYTES # BLD AUTO: 3.46 X10*3/UL (ref 1.2–4.8)
LYMPHOCYTES NFR BLD AUTO: 40.7 %
MAGNESIUM SERPL-MCNC: 1.97 MG/DL (ref 1.6–2.4)
MCH RBC QN AUTO: 36.3 PG (ref 26–34)
MCHC RBC AUTO-ENTMCNC: 32.3 G/DL (ref 32–36)
MCV RBC AUTO: 112 FL (ref 80–100)
MONOCYTES # BLD AUTO: 0.6 X10*3/UL (ref 0.1–1)
MONOCYTES NFR BLD AUTO: 7.1 %
NEUTROPHILS # BLD AUTO: 4.35 X10*3/UL (ref 1.2–7.7)
NEUTROPHILS NFR BLD AUTO: 51.1 %
NOROVIRUS GI + GII RNA STL NAA+PROBE: NOT DETECTED
NRBC BLD-RTO: 0.2 /100 WBCS (ref 0–0)
P AXIS: 24 DEGREES
P OFFSET: 192 MS
P ONSET: 112 MS
PHOSPHATE SERPL-MCNC: 3.1 MG/DL (ref 2.5–4.9)
PLATELET # BLD AUTO: 141 X10*3/UL (ref 150–450)
POTASSIUM SERPL-SCNC: 3.8 MMOL/L (ref 3.5–5.3)
PROT SERPL-MCNC: 4.2 G/DL (ref 6.4–8.2)
PROTHROMBIN TIME: 12.3 SECONDS (ref 9.8–12.8)
Q ONSET: 226 MS
QRS COUNT: 22 BEATS
QRS DURATION: 58 MS
QT INTERVAL: 282 MS
QTC CALCULATION(BAZETT): 413 MS
QTC FREDERICIA: 364 MS
R AXIS: 62 DEGREES
RBC # BLD AUTO: 2.4 X10*6/UL (ref 4.5–5.9)
RV RNA STL NAA+PROBE: NOT DETECTED
SALMONELLA DNA STL QL NAA+PROBE: NOT DETECTED
SHIGELLA DNA SPEC QL NAA+PROBE: NOT DETECTED
SODIUM SERPL-SCNC: 135 MMOL/L (ref 136–145)
T AXIS: 190 DEGREES
T OFFSET: 367 MS
UFH PPP CHRO-ACNC: 0.4 IU/ML
V CHOLERAE DNA STL QL NAA+PROBE: NOT DETECTED
VENTRICULAR RATE: 129 BPM
WBC # BLD AUTO: 8.5 X10*3/UL (ref 4.4–11.3)
Y ENTEROCOL DNA STL QL NAA+PROBE: NOT DETECTED

## 2023-11-26 PROCEDURE — 36415 COLL VENOUS BLD VENIPUNCTURE: CPT

## 2023-11-26 PROCEDURE — 83735 ASSAY OF MAGNESIUM: CPT

## 2023-11-26 PROCEDURE — 85610 PROTHROMBIN TIME: CPT

## 2023-11-26 PROCEDURE — 1100000001 HC PRIVATE ROOM DAILY

## 2023-11-26 PROCEDURE — 2500000004 HC RX 250 GENERAL PHARMACY W/ HCPCS (ALT 636 FOR OP/ED)

## 2023-11-26 PROCEDURE — 2500000002 HC RX 250 W HCPCS SELF ADMINISTERED DRUGS (ALT 637 FOR MEDICARE OP, ALT 636 FOR OP/ED)

## 2023-11-26 PROCEDURE — S4991 NICOTINE PATCH NONLEGEND: HCPCS

## 2023-11-26 PROCEDURE — 2500000001 HC RX 250 WO HCPCS SELF ADMINISTERED DRUGS (ALT 637 FOR MEDICARE OP)

## 2023-11-26 PROCEDURE — 80053 COMPREHEN METABOLIC PANEL: CPT

## 2023-11-26 PROCEDURE — 85025 COMPLETE CBC W/AUTO DIFF WBC: CPT

## 2023-11-26 PROCEDURE — 84100 ASSAY OF PHOSPHORUS: CPT

## 2023-11-26 PROCEDURE — 93005 ELECTROCARDIOGRAM TRACING: CPT

## 2023-11-26 PROCEDURE — 2500000004 HC RX 250 GENERAL PHARMACY W/ HCPCS (ALT 636 FOR OP/ED): Performed by: STUDENT IN AN ORGANIZED HEALTH CARE EDUCATION/TRAINING PROGRAM

## 2023-11-26 PROCEDURE — 99233 SBSQ HOSP IP/OBS HIGH 50: CPT | Performed by: INTERNAL MEDICINE

## 2023-11-26 PROCEDURE — 85520 HEPARIN ASSAY: CPT

## 2023-11-26 RX ORDER — DICLOFENAC SODIUM 10 MG/G
4 GEL TOPICAL 4 TIMES DAILY PRN
Status: DISCONTINUED | OUTPATIENT
Start: 2023-11-26 | End: 2023-12-01 | Stop reason: HOSPADM

## 2023-11-26 RX ORDER — CEFTRIAXONE 1 G/50ML
1 INJECTION, SOLUTION INTRAVENOUS EVERY 24 HOURS
Status: DISCONTINUED | OUTPATIENT
Start: 2023-11-26 | End: 2023-11-28

## 2023-11-26 RX ADMIN — PANTOPRAZOLE SODIUM 40 MG: 40 TABLET, DELAYED RELEASE ORAL at 08:48

## 2023-11-26 RX ADMIN — DICLOFENAC SODIUM 1 APPLICATION: 10 GEL TOPICAL at 18:01

## 2023-11-26 RX ADMIN — CEFTRIAXONE SODIUM 1 G: 1 INJECTION, SOLUTION INTRAVENOUS at 13:17

## 2023-11-26 RX ADMIN — LORAZEPAM 0.5 MG: 0.5 TABLET ORAL at 11:54

## 2023-11-26 RX ADMIN — RISPERIDONE 1 MG: 1 TABLET ORAL at 20:35

## 2023-11-26 RX ADMIN — ACETAMINOPHEN 650 MG: 325 TABLET ORAL at 15:59

## 2023-11-26 RX ADMIN — LACTULOSE 20 G: 20 SOLUTION ORAL at 08:49

## 2023-11-26 RX ADMIN — THIAMINE HYDROCHLORIDE 200 MG: 100 INJECTION, SOLUTION INTRAMUSCULAR; INTRAVENOUS at 08:55

## 2023-11-26 RX ADMIN — ACETAMINOPHEN 650 MG: 325 TABLET ORAL at 08:50

## 2023-11-26 RX ADMIN — RISPERIDONE 1 MG: 1 TABLET ORAL at 08:47

## 2023-11-26 RX ADMIN — FOLIC ACID 1 MG: 1 TABLET ORAL at 08:49

## 2023-11-26 RX ADMIN — Medication 1 TABLET: at 08:47

## 2023-11-26 RX ADMIN — HEPARIN SODIUM 9 UNITS/HR: 10000 INJECTION, SOLUTION INTRAVENOUS at 08:51

## 2023-11-26 RX ADMIN — Medication 1 PATCH: at 08:49

## 2023-11-26 ASSESSMENT — PAIN SCALES - GENERAL
PAINLEVEL_OUTOF10: 4
PAINLEVEL_OUTOF10: 3
PAINLEVEL_OUTOF10: 5 - MODERATE PAIN
PAINLEVEL_OUTOF10: 4

## 2023-11-26 ASSESSMENT — LIFESTYLE VARIABLES
TOTAL SCORE: 0
HEADACHE, FULLNESS IN HEAD: NOT PRESENT
BLOOD PRESSURE: 115/82
AGITATION: NORMAL ACTIVITY
PULSE: 112
NAUSEA AND VOMITING: NO NAUSEA AND NO VOMITING
BLOOD PRESSURE: 117/83
NAUSEA AND VOMITING: NO NAUSEA AND NO VOMITING
TREMOR: NO TREMOR
ORIENTATION AND CLOUDING OF SENSORIUM: ORIENTED AND CAN DO SERIAL ADDITIONS
ANXIETY: NO ANXIETY, AT EASE
VISUAL DISTURBANCES: NOT PRESENT
TOTAL SCORE: 0
VISUAL DISTURBANCES: NOT PRESENT
PAROXYSMAL SWEATS: NO SWEAT VISIBLE
NAUSEA AND VOMITING: NO NAUSEA AND NO VOMITING
HEADACHE, FULLNESS IN HEAD: NOT PRESENT
TREMOR: NO TREMOR
TOTAL SCORE: 0
TREMOR: NO TREMOR
TREMOR: NO TREMOR
PAROXYSMAL SWEATS: NO SWEAT VISIBLE
PAROXYSMAL SWEATS: NO SWEAT VISIBLE
NAUSEA AND VOMITING: NO NAUSEA AND NO VOMITING
AGITATION: NORMAL ACTIVITY
ANXIETY: NO ANXIETY, AT EASE
ANXIETY: NO ANXIETY, AT EASE
AUDITORY DISTURBANCES: NOT PRESENT
VISUAL DISTURBANCES: NOT PRESENT
HEADACHE, FULLNESS IN HEAD: NOT PRESENT
VISUAL DISTURBANCES: NOT PRESENT
PAROXYSMAL SWEATS: NO SWEAT VISIBLE
ORIENTATION AND CLOUDING OF SENSORIUM: ORIENTED AND CAN DO SERIAL ADDITIONS
TOTAL SCORE: 0
VISUAL DISTURBANCES: NOT PRESENT
NAUSEA AND VOMITING: NO NAUSEA AND NO VOMITING
PAROXYSMAL SWEATS: NO SWEAT VISIBLE
VISUAL DISTURBANCES: NOT PRESENT
VISUAL DISTURBANCES: NOT PRESENT
AUDITORY DISTURBANCES: NOT PRESENT
TOTAL SCORE: 0
PULSE: 116
AUDITORY DISTURBANCES: NOT PRESENT
HEADACHE, FULLNESS IN HEAD: NOT PRESENT
TOTAL SCORE: 0
ANXIETY: NO ANXIETY, AT EASE
PULSE: 112
NAUSEA AND VOMITING: NO NAUSEA AND NO VOMITING
ORIENTATION AND CLOUDING OF SENSORIUM: ORIENTED AND CAN DO SERIAL ADDITIONS
AGITATION: NORMAL ACTIVITY
AGITATION: NORMAL ACTIVITY
ORIENTATION AND CLOUDING OF SENSORIUM: ORIENTED AND CAN DO SERIAL ADDITIONS
HEADACHE, FULLNESS IN HEAD: NOT PRESENT
TREMOR: NO TREMOR
NAUSEA AND VOMITING: NO NAUSEA AND NO VOMITING
AUDITORY DISTURBANCES: NOT PRESENT
TREMOR: NO TREMOR
ORIENTATION AND CLOUDING OF SENSORIUM: ORIENTED AND CAN DO SERIAL ADDITIONS
TREMOR: NO TREMOR
TOTAL SCORE: 0
AUDITORY DISTURBANCES: NOT PRESENT
AUDITORY DISTURBANCES: NOT PRESENT
ANXIETY: NO ANXIETY, AT EASE
ORIENTATION AND CLOUDING OF SENSORIUM: ORIENTED AND CAN DO SERIAL ADDITIONS
ANXIETY: NO ANXIETY, AT EASE
PAROXYSMAL SWEATS: NO SWEAT VISIBLE
PAROXYSMAL SWEATS: NO SWEAT VISIBLE
ORIENTATION AND CLOUDING OF SENSORIUM: ORIENTED AND CAN DO SERIAL ADDITIONS
ANXIETY: NO ANXIETY, AT EASE
AGITATION: NORMAL ACTIVITY
AUDITORY DISTURBANCES: NOT PRESENT

## 2023-11-26 ASSESSMENT — PAIN DESCRIPTION - ORIENTATION: ORIENTATION: RIGHT;LEFT

## 2023-11-26 ASSESSMENT — COGNITIVE AND FUNCTIONAL STATUS - GENERAL
DAILY ACTIVITIY SCORE: 24
MOBILITY SCORE: 23
CLIMB 3 TO 5 STEPS WITH RAILING: A LITTLE
DRESSING REGULAR LOWER BODY CLOTHING: A LITTLE
DAILY ACTIVITIY SCORE: 23
MOBILITY SCORE: 24

## 2023-11-26 ASSESSMENT — PAIN DESCRIPTION - DESCRIPTORS: DESCRIPTORS: ACHING;DISCOMFORT

## 2023-11-26 ASSESSMENT — PAIN - FUNCTIONAL ASSESSMENT: PAIN_FUNCTIONAL_ASSESSMENT: 0-10

## 2023-11-26 NOTE — PROGRESS NOTES
"Junito Rodríguez is a 62 y.o. male on day 2 of admission presenting with Acute pulmonary embolism without acute cor pulmonale, unspecified pulmonary embolism type (CMS/HCC).    Subjective   -NAEO  -Pt this AM reporting continued bilateral leg swelling and abdominal swelling/discomfort. He does report feeling better after having 2 Bms yesterday. He denies any bleeding since being on the heparin including hematochezia or melena  -He reports understanding that he has cirrhosis and blood clots. He understands he will need to be on a blood thinner. He understands he needs to abstain from alcohol   -No fever, chills, chest pain. He reports some SOB that is overall improved     Objective   Physical Exam:  General: alert, conversant, in no apparent distress  HEENT: normocephalic, atraumatic, EOMI, no scleral icterus  CV: RRR, no murmurs  Pulm: CTAB, no wheezes or crackles, no increased work of breathing  Abd: soft, distended, +ascites, mild tenderness to palpation diffusely   : no jackson   Ext: warm, bilateral LE edema present. Right LE with pain to palpation   Skin: no rashes, no spider angiomas seen   Neuro: moving all extremities  Psych: normal affect     Last Recorded Vitals  Blood pressure 118/86, pulse 98, temperature 37.4 °C (99.3 °F), resp. rate 17, height 1.753 m (5' 9\"), weight 49.9 kg (110 lb), SpO2 98 %.  Intake/Output last 3 Shifts:  I/O last 3 completed shifts:  In: 810.8 (16.3 mL/kg) [P.O.:480; I.V.:330.8 (6.6 mL/kg)]  Out: 150 (3 mL/kg) [Urine:150 (0.1 mL/kg/hr)]  Weight: 49.9 kg     Relevant Results       Lab Results   Component Value Date    WBC 8.5 11/26/2023    HGB 8.7 (L) 11/26/2023    HCT 26.9 (L) 11/26/2023     (H) 11/26/2023     (L) 11/26/2023     Lab Results   Component Value Date    GLUCOSE 93 11/26/2023    CALCIUM 7.1 (L) 11/26/2023     (L) 11/26/2023    K 3.8 11/26/2023    CO2 29 11/26/2023    CL 99 11/26/2023    BUN 6 11/26/2023    CREATININE 0.76 11/26/2023 "       Assessment/Plan   Principal Problem:    Acute pulmonary embolism without acute cor pulmonale, unspecified pulmonary embolism type (CMS/HCC)    Junito Rodríguez is a 62 y.o. male PMHx of schizophrenia, depression, anxiety, COPD (PFTs not on file), alcohol use disorder, significant tobacco use, and chronic lower back pain.  Patient presented to the ED with 7-day history of leg swelling, abdominal swelling, shortness of breath, and anorexia. Diagnosed with decompensated cirrhosis likely due to alcohol use and bilateral PEs without evidence of right heart strain or hypoxia, extensive R lower extremity DVTs, and IVC thrombus likely provoked in the setting of decreased mobility.     #Bilateral PE's  #Dyspnea on exertion  :: Evident on CT PE  ::Multiple bilateral pulmonary emboli, right-greater-than-left with  the largest embolism located in the right middle lobar artery.  Additional involvement of the right upper and lower lobes and  segmental/subsegmental involvement in the left lower lobe.  -DVT ultrasound 11/25 with R prox fem vein through popliteal, posterior tibial, and peroneal thrombus. No evidence of clot in the left leg   :: Trop:30 Lactate:2.4  BNP: 54  -continue heparin gtt for anticoagulation. Anticipate transitioning to DOAC   -Echo 11/25 with hyperdynamic EF and no evidence of RV strain     #Massive ascites  #Decompensated cirrhosis likely alcohol related   :: Imaging showing parenchymal disease  :: Labs showing AST over ALT value greater than 2 with AST 43 and ALT  20  :: INR elevated at 1.3  :: Albumin decreased at 1.8  :: Normal bilirubin elevated alk phos  :: Likely in the setting of alcoholic cirrhosis    :: Meld Na score 10  -Hepatitis vital panel negative. Chronic liver panel sent (cerruloplasmin, Ferritin etc.) pending  -US doppler of liver with cirrhotic morphology, no evidence of thrombus in the hepatic vasculature   -CTX 1 g daily for SBP prophylaxis given possible enterocolitis seen on imaging  and abd pain   - Plan for IR consult on Monday for diagnostic and therapeutic paracentesis. Will need to hold heparin for 4 hours pre procedure  - Lactulose started for constipation. No evidence of HE   -Pt counseled on alcohol cessation and reports understanding      #Possible enterocolitis  #Weight loss, change in appetite  -no further diarrhea  -continuing empiric abx as above   -ordered stool pathogen PCR negative    #COPD  -Continue home albuterol and Breo Ellipta     #Depression  #Anxiety  -Continue home risperidone 1 mg twice daily     #Alcohol abuse  -[facility] protocol  -Folate supplementation and multivitamin  -Folate and B12 level.  -Patient consulted     #Chronic Tobacco use  - Nicotine patch      F: Status post 500 mL LR  E: k>4 Mg>2  N: low sodium diet, 2L fluid restriction   GI ppx: Not indicated   DVT ppx: heparin gtt    Dispo: pending PT/OT eval     Full code  Next of kin: Daughter (Karlee) : [phone]     Ana Ennis MD

## 2023-11-26 NOTE — CARE PLAN
The patient's goals for the shift include      The clinical goals for the shift include pt will havew improved edema form fluid excess fluid accumulation    Over the shift, the patient did not make progress toward the following goals. Barriers to progression include . Recommendations to address these barriers include   Problem: Fall/Injury  Goal: Not fall by end of shift  Outcome: Progressing  Goal: Be free from injury by end of the shift  Outcome: Progressing  Goal: Verbalize understanding of personal risk factors for fall in the hospital  Outcome: Progressing  Goal: Verbalize understanding of risk factor reduction measures to prevent injury from fall in the home  Outcome: Progressing  Goal: Use assistive devices by end of the shift  Outcome: Progressing  Goal: Pace activities to prevent fatigue by end of the shift  Outcome: Progressing     Problem: Pain  Goal: Takes deep breaths with improved pain control throughout the shift  Outcome: Progressing  Goal: Turns in bed with improved pain control throughout the shift  Outcome: Progressing  Goal: Walks with improved pain control throughout the shift  Outcome: Progressing  Goal: Performs ADL's with improved pain control throughout shift  Outcome: Progressing  Goal: Participates in PT with improved pain control throughout the shift  Outcome: Progressing  Goal: Free from opioid side effects throughout the shift  Outcome: Progressing  Goal: Free from acute confusion related to pain meds throughout the shift  Outcome: Progressing     Problem: Fall/Injury  Goal: Not fall by end of shift  Outcome: Progressing  Goal: Be free from injury by end of the shift  Outcome: Progressing  Goal: Verbalize understanding of personal risk factors for fall in the hospital  Outcome: Progressing  Goal: Verbalize understanding of risk factor reduction measures to prevent injury from fall in the home  Outcome: Progressing  Goal: Use assistive devices by end of the shift  Outcome:  Progressing  Goal: Pace activities to prevent fatigue by end of the shift  Outcome: Progressing     Problem: Pain  Goal: Takes deep breaths with improved pain control throughout the shift  Outcome: Progressing  Goal: Turns in bed with improved pain control throughout the shift  Outcome: Progressing  Goal: Walks with improved pain control throughout the shift  Outcome: Progressing  Goal: Performs ADL's with improved pain control throughout shift  Outcome: Progressing  Goal: Participates in PT with improved pain control throughout the shift  Outcome: Progressing  Goal: Free from opioid side effects throughout the shift  Outcome: Progressing  Goal: Free from acute confusion related to pain meds throughout the shift  Outcome: Progressing   .

## 2023-11-26 NOTE — CARE PLAN
The patient's goals for the shift include      The clinical goals for the shift include Pt will mainain CIWA score below 5    Over the shift, the patient did not make progress toward the following goals. Barriers to progression include . Recommendations to address these barriers include   Problem: Fall/Injury  Goal: Not fall by end of shift  Outcome: Progressing  Goal: Be free from injury by end of the shift  Outcome: Progressing  Goal: Verbalize understanding of personal risk factors for fall in the hospital  Outcome: Progressing  Goal: Verbalize understanding of risk factor reduction measures to prevent injury from fall in the home  Outcome: Progressing  Goal: Use assistive devices by end of the shift  Outcome: Progressing  Goal: Pace activities to prevent fatigue by end of the shift  Outcome: Progressing     Problem: Pain  Goal: Takes deep breaths with improved pain control throughout the shift  Outcome: Progressing  Goal: Turns in bed with improved pain control throughout the shift  Outcome: Progressing  Goal: Walks with improved pain control throughout the shift  Outcome: Progressing  Goal: Performs ADL's with improved pain control throughout shift  Outcome: Progressing  Goal: Participates in PT with improved pain control throughout the shift  Outcome: Progressing  Goal: Free from opioid side effects throughout the shift  Outcome: Progressing  Goal: Free from acute confusion related to pain meds throughout the shift  Outcome: Progressing   .

## 2023-11-27 ENCOUNTER — PHARMACY VISIT (OUTPATIENT)
Dept: PHARMACY | Facility: CLINIC | Age: 63
End: 2023-11-27
Payer: MEDICARE

## 2023-11-27 LAB
ALBUMIN SERPL BCP-MCNC: 1.8 G/DL (ref 3.4–5)
ALP SERPL-CCNC: 97 U/L (ref 33–136)
ALT SERPL W P-5'-P-CCNC: 14 U/L (ref 10–52)
ANION GAP SERPL CALC-SCNC: 10 MMOL/L (ref 10–20)
APTT PPP: 32 SECONDS (ref 27–38)
AST SERPL W P-5'-P-CCNC: 27 U/L (ref 9–39)
BASOPHILS # BLD AUTO: 0.02 X10*3/UL (ref 0–0.1)
BASOPHILS NFR BLD AUTO: 0.2 %
BILIRUB SERPL-MCNC: 0.5 MG/DL (ref 0–1.2)
BUN SERPL-MCNC: 4 MG/DL (ref 6–23)
CALCIUM SERPL-MCNC: 7.4 MG/DL (ref 8.6–10.6)
CHLORIDE SERPL-SCNC: 98 MMOL/L (ref 98–107)
CHOLEST SERPL-MCNC: 82 MG/DL (ref 0–199)
CHOLESTEROL/HDL RATIO: 2.7
CO2 SERPL-SCNC: 30 MMOL/L (ref 21–32)
CREAT SERPL-MCNC: 0.77 MG/DL (ref 0.5–1.3)
EOSINOPHIL # BLD AUTO: 0 X10*3/UL (ref 0–0.7)
EOSINOPHIL NFR BLD AUTO: 0 %
ERYTHROCYTE [DISTWIDTH] IN BLOOD BY AUTOMATED COUNT: 17.3 % (ref 11.5–14.5)
GFR SERPL CREATININE-BSD FRML MDRD: >90 ML/MIN/1.73M*2
GLUCOSE SERPL-MCNC: 100 MG/DL (ref 74–99)
HCT VFR BLD AUTO: 28.4 % (ref 41–52)
HDLC SERPL-MCNC: 30.8 MG/DL
HGB BLD-MCNC: 9.7 G/DL (ref 13.5–17.5)
IMM GRANULOCYTES # BLD AUTO: 0.03 X10*3/UL (ref 0–0.7)
IMM GRANULOCYTES NFR BLD AUTO: 0.4 % (ref 0–0.9)
INR PPP: 1 (ref 0.9–1.1)
LDLC SERPL CALC-MCNC: 31 MG/DL
LYMPHOCYTES # BLD AUTO: 2.52 X10*3/UL (ref 1.2–4.8)
LYMPHOCYTES NFR BLD AUTO: 29.5 %
MAGNESIUM SERPL-MCNC: 1.75 MG/DL (ref 1.6–2.4)
MCH RBC QN AUTO: 35.7 PG (ref 26–34)
MCHC RBC AUTO-ENTMCNC: 34.2 G/DL (ref 32–36)
MCV RBC AUTO: 104 FL (ref 80–100)
MONOCYTES # BLD AUTO: 0.67 X10*3/UL (ref 0.1–1)
MONOCYTES NFR BLD AUTO: 7.9 %
NEUTROPHILS # BLD AUTO: 5.29 X10*3/UL (ref 1.2–7.7)
NEUTROPHILS NFR BLD AUTO: 62 %
NON HDL CHOLESTEROL: 51 MG/DL (ref 0–149)
NRBC BLD-RTO: 0 /100 WBCS (ref 0–0)
PLATELET # BLD AUTO: 166 X10*3/UL (ref 150–450)
POTASSIUM SERPL-SCNC: 3.4 MMOL/L (ref 3.5–5.3)
PROT SERPL-MCNC: 4.5 G/DL (ref 6.4–8.2)
PROT SERPL-MCNC: 4.9 G/DL (ref 6.4–8.2)
PROTHROMBIN TIME: 11.6 SECONDS (ref 9.8–12.8)
RBC # BLD AUTO: 2.72 X10*6/UL (ref 4.5–5.9)
SODIUM SERPL-SCNC: 135 MMOL/L (ref 136–145)
TRIGL SERPL-MCNC: 103 MG/DL (ref 0–149)
UFH PPP CHRO-ACNC: 0.1 IU/ML
UFH PPP CHRO-ACNC: 0.6 IU/ML
VLDL: 21 MG/DL (ref 0–40)
WBC # BLD AUTO: 8.5 X10*3/UL (ref 4.4–11.3)

## 2023-11-27 PROCEDURE — 36415 COLL VENOUS BLD VENIPUNCTURE: CPT | Performed by: STUDENT IN AN ORGANIZED HEALTH CARE EDUCATION/TRAINING PROGRAM

## 2023-11-27 PROCEDURE — 80061 LIPID PANEL: CPT | Performed by: STUDENT IN AN ORGANIZED HEALTH CARE EDUCATION/TRAINING PROGRAM

## 2023-11-27 PROCEDURE — 2500000004 HC RX 250 GENERAL PHARMACY W/ HCPCS (ALT 636 FOR OP/ED)

## 2023-11-27 PROCEDURE — 2500000005 HC RX 250 GENERAL PHARMACY W/O HCPCS: Performed by: STUDENT IN AN ORGANIZED HEALTH CARE EDUCATION/TRAINING PROGRAM

## 2023-11-27 PROCEDURE — 85025 COMPLETE CBC W/AUTO DIFF WBC: CPT | Performed by: STUDENT IN AN ORGANIZED HEALTH CARE EDUCATION/TRAINING PROGRAM

## 2023-11-27 PROCEDURE — 2500000001 HC RX 250 WO HCPCS SELF ADMINISTERED DRUGS (ALT 637 FOR MEDICARE OP)

## 2023-11-27 PROCEDURE — S4991 NICOTINE PATCH NONLEGEND: HCPCS

## 2023-11-27 PROCEDURE — 85610 PROTHROMBIN TIME: CPT | Performed by: STUDENT IN AN ORGANIZED HEALTH CARE EDUCATION/TRAINING PROGRAM

## 2023-11-27 PROCEDURE — 99233 SBSQ HOSP IP/OBS HIGH 50: CPT | Performed by: INTERNAL MEDICINE

## 2023-11-27 PROCEDURE — 36415 COLL VENOUS BLD VENIPUNCTURE: CPT

## 2023-11-27 PROCEDURE — 2500000002 HC RX 250 W HCPCS SELF ADMINISTERED DRUGS (ALT 637 FOR MEDICARE OP, ALT 636 FOR OP/ED)

## 2023-11-27 PROCEDURE — 1100000001 HC PRIVATE ROOM DAILY

## 2023-11-27 PROCEDURE — 83735 ASSAY OF MAGNESIUM: CPT | Performed by: STUDENT IN AN ORGANIZED HEALTH CARE EDUCATION/TRAINING PROGRAM

## 2023-11-27 PROCEDURE — 85520 HEPARIN ASSAY: CPT

## 2023-11-27 PROCEDURE — 80053 COMPREHEN METABOLIC PANEL: CPT | Performed by: STUDENT IN AN ORGANIZED HEALTH CARE EDUCATION/TRAINING PROGRAM

## 2023-11-27 PROCEDURE — 2500000004 HC RX 250 GENERAL PHARMACY W/ HCPCS (ALT 636 FOR OP/ED): Performed by: STUDENT IN AN ORGANIZED HEALTH CARE EDUCATION/TRAINING PROGRAM

## 2023-11-27 RX ORDER — POTASSIUM CHLORIDE 20 MEQ/1
40 TABLET, EXTENDED RELEASE ORAL ONCE
Status: COMPLETED | OUTPATIENT
Start: 2023-11-27 | End: 2023-11-27

## 2023-11-27 RX ORDER — MAGNESIUM SULFATE HEPTAHYDRATE 40 MG/ML
2 INJECTION, SOLUTION INTRAVENOUS ONCE
Status: COMPLETED | OUTPATIENT
Start: 2023-11-27 | End: 2023-11-27

## 2023-11-27 RX ORDER — ONDANSETRON 4 MG/1
4 TABLET, FILM COATED ORAL EVERY 8 HOURS PRN
Status: DISCONTINUED | OUTPATIENT
Start: 2023-11-27 | End: 2023-12-01 | Stop reason: HOSPADM

## 2023-11-27 RX ADMIN — POTASSIUM CHLORIDE 40 MEQ: 1500 TABLET, EXTENDED RELEASE ORAL at 16:47

## 2023-11-27 RX ADMIN — RISPERIDONE 1 MG: 1 TABLET ORAL at 20:17

## 2023-11-27 RX ADMIN — Medication 1 TABLET: at 08:50

## 2023-11-27 RX ADMIN — HEPARIN SODIUM 900 UNITS/HR: 10000 INJECTION, SOLUTION INTRAVENOUS at 12:32

## 2023-11-27 RX ADMIN — ACETAMINOPHEN 650 MG: 325 TABLET ORAL at 17:06

## 2023-11-27 RX ADMIN — CEFTRIAXONE SODIUM 1 G: 1 INJECTION, SOLUTION INTRAVENOUS at 12:28

## 2023-11-27 RX ADMIN — MAGNESIUM SULFATE HEPTAHYDRATE 2 G: 40 INJECTION, SOLUTION INTRAVENOUS at 16:47

## 2023-11-27 RX ADMIN — RISPERIDONE 1 MG: 1 TABLET ORAL at 08:50

## 2023-11-27 RX ADMIN — LACTULOSE 20 G: 20 SOLUTION ORAL at 08:50

## 2023-11-27 RX ADMIN — FOLIC ACID 1 MG: 1 TABLET ORAL at 08:50

## 2023-11-27 RX ADMIN — HEPARIN SODIUM 1000 UNITS/HR: 10000 INJECTION, SOLUTION INTRAVENOUS at 18:27

## 2023-11-27 RX ADMIN — Medication 1 PATCH: at 09:00

## 2023-11-27 RX ADMIN — ONDANSETRON HYDROCHLORIDE 4 MG: 4 TABLET, FILM COATED ORAL at 12:28

## 2023-11-27 RX ADMIN — THIAMINE HYDROCHLORIDE 200 MG: 100 INJECTION, SOLUTION INTRAMUSCULAR; INTRAVENOUS at 08:51

## 2023-11-27 RX ADMIN — PANTOPRAZOLE SODIUM 40 MG: 40 TABLET, DELAYED RELEASE ORAL at 08:50

## 2023-11-27 RX ADMIN — ACETAMINOPHEN 650 MG: 325 TABLET ORAL at 08:50

## 2023-11-27 ASSESSMENT — LIFESTYLE VARIABLES
ANXIETY: NO ANXIETY, AT EASE
PAROXYSMAL SWEATS: NO SWEAT VISIBLE
ORIENTATION AND CLOUDING OF SENSORIUM: ORIENTED AND CAN DO SERIAL ADDITIONS
ORIENTATION AND CLOUDING OF SENSORIUM: ORIENTED AND CAN DO SERIAL ADDITIONS
TREMOR: NO TREMOR
HEADACHE, FULLNESS IN HEAD: NOT PRESENT
AUDITORY DISTURBANCES: NOT PRESENT
ANXIETY: NO ANXIETY, AT EASE
PAROXYSMAL SWEATS: NO SWEAT VISIBLE
TOTAL SCORE: 0
HEADACHE, FULLNESS IN HEAD: NOT PRESENT
TREMOR: NO TREMOR
ORIENTATION AND CLOUDING OF SENSORIUM: ORIENTED AND CAN DO SERIAL ADDITIONS
ANXIETY: NO ANXIETY, AT EASE
AUDITORY DISTURBANCES: NOT PRESENT
PAROXYSMAL SWEATS: NO SWEAT VISIBLE
TREMOR: NO TREMOR
VISUAL DISTURBANCES: NOT PRESENT
VISUAL DISTURBANCES: NOT PRESENT
AUDITORY DISTURBANCES: NOT PRESENT
VISUAL DISTURBANCES: NOT PRESENT
AUDITORY DISTURBANCES: NOT PRESENT
TREMOR: NO TREMOR
HEADACHE, FULLNESS IN HEAD: NOT PRESENT
TOTAL SCORE: 0
AGITATION: NORMAL ACTIVITY
ORIENTATION AND CLOUDING OF SENSORIUM: ORIENTED AND CAN DO SERIAL ADDITIONS
ORIENTATION AND CLOUDING OF SENSORIUM: ORIENTED AND CAN DO SERIAL ADDITIONS
TREMOR: NO TREMOR
NAUSEA AND VOMITING: NO NAUSEA AND NO VOMITING
TREMOR: NO TREMOR
NAUSEA AND VOMITING: NO NAUSEA AND NO VOMITING
HEADACHE, FULLNESS IN HEAD: NOT PRESENT
NAUSEA AND VOMITING: NO NAUSEA AND NO VOMITING
TOTAL SCORE: 0
ANXIETY: NO ANXIETY, AT EASE
HEADACHE, FULLNESS IN HEAD: NOT PRESENT
ORIENTATION AND CLOUDING OF SENSORIUM: ORIENTED AND CAN DO SERIAL ADDITIONS
PAROXYSMAL SWEATS: NO SWEAT VISIBLE
AGITATION: NORMAL ACTIVITY
HEADACHE, FULLNESS IN HEAD: NOT PRESENT
PAROXYSMAL SWEATS: NO SWEAT VISIBLE
AUDITORY DISTURBANCES: NOT PRESENT
AUDITORY DISTURBANCES: NOT PRESENT
VISUAL DISTURBANCES: NOT PRESENT
NAUSEA AND VOMITING: NO NAUSEA AND NO VOMITING
TOTAL SCORE: 0
TOTAL SCORE: 0
AGITATION: NORMAL ACTIVITY
ANXIETY: NO ANXIETY, AT EASE
PAROXYSMAL SWEATS: NO SWEAT VISIBLE
TOTAL SCORE: 0
AGITATION: NORMAL ACTIVITY
VISUAL DISTURBANCES: NOT PRESENT
PULSE: 109
VISUAL DISTURBANCES: NOT PRESENT
AGITATION: NORMAL ACTIVITY
ANXIETY: NO ANXIETY, AT EASE
AGITATION: NORMAL ACTIVITY

## 2023-11-27 ASSESSMENT — COGNITIVE AND FUNCTIONAL STATUS - GENERAL
DRESSING REGULAR LOWER BODY CLOTHING: A LITTLE
CLIMB 3 TO 5 STEPS WITH RAILING: A LITTLE
MOBILITY SCORE: 23
DAILY ACTIVITIY SCORE: 23

## 2023-11-27 ASSESSMENT — PAIN DESCRIPTION - DESCRIPTORS: DESCRIPTORS: DISCOMFORT;PRESSURE

## 2023-11-27 ASSESSMENT — PAIN SCALES - GENERAL
PAINLEVEL_OUTOF10: 3

## 2023-11-27 ASSESSMENT — PAIN - FUNCTIONAL ASSESSMENT
PAIN_FUNCTIONAL_ASSESSMENT: 0-10

## 2023-11-27 ASSESSMENT — ACTIVITIES OF DAILY LIVING (ADL): LACK_OF_TRANSPORTATION: NO

## 2023-11-27 NOTE — PROGRESS NOTES
Junito Rodríguez is a 62 y.o. male on day 3 of admission presenting with Acute pulmonary embolism without acute cor pulmonale, unspecified pulmonary embolism type (CMS/HCC).    Subjective   -NAEO, has had continued abdominal pain, nausea, consistent from previous days. Had some loose BMs that have now become normal.   No fevers, chills, chest pain, SOB, vomiting, blood in stool.        Current Facility-Administered Medications:     acetaminophen (Tylenol) tablet 650 mg, 650 mg, oral, q4h PRN, Grady Rodriguez MD, 650 mg at 11/26/23 1559    albuterol 2.5 mg /3 mL (0.083 %) nebulizer solution 2.5 mg, 2.5 mg, nebulization, q6h PRN, Grady Rodriguez MD    cefTRIAXone (Rocephin) IVPB 1 g, 1 g, intravenous, q24h, Sesar Rowland MD PhD, Stopped at 11/26/23 1347    diclofenac sodium (Voltaren) 1 % gel 1 Application, 4 g, Topical, 4x daily PRN, Sesar Rowland MD PhD, 1 Application at 11/26/23 1801    fluticasone furoate-vilanteroL (Breo Ellipta) 200-25 mcg/dose inhaler 1 puff, 1 puff, inhalation, Daily, Grady Rodriguez MD    folic acid (Folvite) tablet 1 mg, 1 mg, oral, Daily, Grady Rodriguez MD, 1 mg at 11/26/23 0849    heparin (porcine) injection 2,000-4,000 Units, 2,000-4,000 Units, intravenous, q4h PRN, Grady Rodriguez MD    [Held by provider] heparin 25,000 Units in dextrose 5% 250 mL (100 Units/mL) infusion (premix), 0-4,500 Units/hr, intravenous, Continuous, Grady Rodriguez MD, Stopped at 11/27/23 0500    lactulose 20 gram/30 mL oral solution 20 g, 20 g, oral, Daily, Grady Rodriguez MD, 20 g at 11/26/23 0849    LORazepam (Ativan) tablet 0.5 mg, 0.5 mg, oral, q2h PRN, 0.5 mg at 11/26/23 1154 **OR** LORazepam (Ativan) tablet 1 mg, 1 mg, oral, q2h PRN **OR** LORazepam (Ativan) tablet 2 mg, 2 mg, oral, q2h PRN, Grady Rodriguez MD    multivitamin with minerals 1 tablet, 1 tablet, oral, Daily, Grady Rodriguez MD, 1 tablet at 11/26/23 0847    nicotine (Nicoderm CQ) 14 mg/24 hr patch 1 patch, 1 patch, transdermal, Daily, Grady Rodriguez MD, 1  "patch at 11/26/23 0849    pantoprazole (ProtoNix) EC tablet 40 mg, 40 mg, oral, Daily before breakfast, Grady Rodriguez MD, 40 mg at 11/26/23 0848    risperiDONE (RisperDAL) tablet 1 mg, 1 mg, oral, BID, Grady Rodriguez MD, 1 mg at 11/26/23 2035    sulfur hexafluoride microsphr (Lumason) injection 24.28 mg, 2 mL, intravenous, Once in imaging, Grady Rodriguez MD    thiamine (Vitamin B1) 200 mg in dextrose 5 % in water (D5W) 100 mL IV, 200 mg, intravenous, Daily, Ana Ennis MD, Last Rate: 200 mL/hr at 11/26/23 0855, 200 mg at 11/26/23 0855      Objective   Physical Exam:  General: alert, conversant, in no apparent distress  HEENT: normocephalic, atraumatic, EOMI, no scleral icterus  CV: RRR, no murmurs  Pulm: CTAB, no wheezes or crackles, no increased work of breathing  Abd: firm, distended, +ascites, mild tenderness to palpation diffusely, shifting dullness pos   : no jackson   Ext: warm, bilateral LE edema present. Right LE with pain to palpation   Skin: no rashes, no spider angiomas seen   Neuro: moving all extremities  Psych: normal affect     Last Recorded Vitals  Blood pressure 106/70, pulse 102, temperature 37.2 °C (99 °F), temperature source Temporal, resp. rate 18, height 1.753 m (5' 9\"), weight 49.9 kg (110 lb), SpO2 96 %.  Intake/Output last 3 Shifts:  I/O last 3 completed shifts:  In: 1638.3 (32.8 mL/kg) [P.O.:960; I.V.:478.3 (9.6 mL/kg); IV Piggyback:200]  Out: 900 (18 mL/kg) [Urine:900 (0.5 mL/kg/hr)]  Weight: 49.9 kg         Relevant Results       Lab Results   Component Value Date    WBC 8.5 11/26/2023    HGB 8.7 (L) 11/26/2023    HCT 26.9 (L) 11/26/2023     (H) 11/26/2023     (L) 11/26/2023     Lab Results   Component Value Date    GLUCOSE 93 11/26/2023    CALCIUM 7.1 (L) 11/26/2023     (L) 11/26/2023    K 3.8 11/26/2023    CO2 29 11/26/2023    CL 99 11/26/2023    BUN 6 11/26/2023    CREATININE 0.76 11/26/2023       Assessment/Plan   Principal Problem:    Acute pulmonary embolism " without acute cor pulmonale, unspecified pulmonary embolism type (CMS/HCC)    Junito Rodríguez is a 62 y.o. male PMHx of schizophrenia, depression, anxiety, COPD (PFTs not on file), alcohol use disorder, significant tobacco use, and chronic lower back pain.  Patient presented to the ED with 7-day history of leg swelling, abdominal swelling, shortness of breath, and anorexia. Diagnosed with decompensated cirrhosis likely due to alcohol use and bilateral PEs without evidence of right heart strain or hypoxia, extensive R lower extremity DVTs, and IVC thrombus likely provoked in the setting of decreased mobility.     Today:  Para per IR, OK to continue heparin gtt and diet- likely needs albumin following para  Fu ascitic fluid analysis, SBP  Orthostatic vitals  Will reach out to pharmacy regarding Breo Ellipta not being administered  SPEP, multiple myeloma labs    #Bilateral PE's  #Dyspnea on exertion  :: Evident on CT PE  ::Multiple bilateral pulmonary emboli, right-greater-than-left with  the largest embolism located in the right middle lobar artery.  Additional involvement of the right upper and lower lobes and  segmental/subsegmental involvement in the left lower lobe.  -DVT ultrasound 11/25 with R prox fem vein through popliteal, posterior tibial, and peroneal thrombus. No evidence of clot in the left leg   :: Trop:30 Lactate:2.4  BNP: 54  -continue heparin gtt for anticoagulation. Anticipate transitioning to DOAC   -Echo 11/25 with hyperdynamic EF and no evidence of RV strain     #Massive ascites  #Decompensated cirrhosis likely alcohol related   :: Meld Na score 10  -US doppler of liver with cirrhotic morphology, no evidence of thrombus in the hepatic vasculature   -CTX 1 g daily for SBP prophylaxis given possible enterocolitis seen on imaging and abd pain   - Lactulose started for constipation. No evidence of HE   -Pt counseled on alcohol cessation and reports understanding   [] IR para today  [] FU fluid analysis,  can DC ctx if no SBP  [] Titrate lactulose to 3-4 BM a day  [] Will evaluate BP after para for diuretic initiation     #Possible enterocolitis  #Weight loss, change in appetite  -no further diarrhea  -continuing empiric abx as above   -stool pathogen PCR negative    #COPD  -Continue home albuterol and Breo Ellipta  - Has not gotten breo ellipta in 3 days, will reach out to pharmacy to make sure it is not backorderd     #Depression  #Anxiety  -Continue home risperidone 1 mg twice daily     #Alcohol abuse- some concern of withdrawal given tachycardia  -CIWA protocol, has been scoring 0 but received one time dose of ativan 11/26 likely for tachycardia  -Folate supplementation and multivitamin  -Folate and B12 level  -Patient consulted     #Chronic Tobacco use  - Nicotine patch      F: PRN  E: k>4 Mg>2  N: low sodium diet, 1.6L fluid restriction   GI ppx: PPI  DVT ppx: heparin gtt    Dispo: pending PT/OT eval     Full code  Next of kin: Daughter Sukhdeep) : [phone]     Adriano Braun MD

## 2023-11-27 NOTE — CARE PLAN
The patient's goals for the shift include having his paracentesis by the end of this shift.     The clinical goals for the shift include Pt will have his paracentesis completed by the end of this shift.    Over the shift, the patient did not make progress toward the following goals. Barriers to progression include his paracentesis being pushed back to tomorrow. Recommendations to address these barriers include keeping the patient comfortable until his paracentesis tomorrow.

## 2023-11-27 NOTE — PROGRESS NOTES
11/27/23 1404   Discharge Planning   Living Arrangements Alone   Support Systems Family members;Children   Assistance Needed none   Type of Residence Private residence   Who is requesting discharge planning? Provider   Home or Post Acute Services None   Patient expects to be discharged to: Home   Does the patient need discharge transport arranged? No  (sister to transport home)   Financial Resource Strain   How hard is it for you to pay for the very basics like food, housing, medical care, and heating? Not hard   Housing Stability   In the last 12 months, was there a time when you were not able to pay the mortgage or rent on time? N   In the last 12 months, was there a time when you did not have a steady place to sleep or slept in a shelter (including now)? N   Transportation Needs   In the past 12 months, has lack of transportation kept you from medical appointments or from getting medications? no   In the past 12 months, has lack of transportation kept you from meetings, work, or from getting things needed for daily living? No       PCP:  Pt states he goes to [facility] but does not remember name of PCP   DATE OF LAST VISIT: 3 weeks ago   PHARMACY: [facility] on [address]   RECENT FALLS: pt states he fell 11/25; denies any injuries   MEDICATIONS AFFORDABLE: yes   FEELS SAFE AT HOME: yes   EQUIPMENT USED IN HOME: cane   HOME O2/CPAP/NEBS: N/A  Pt denies having any current HC services.     Address, phone and emergency contact information verified and updated. Pt denies need any chem dep resources for ETOH use. All questions and concerns answered. Will continue to follow for discharge needs.

## 2023-11-28 ENCOUNTER — APPOINTMENT (OUTPATIENT)
Dept: RADIOLOGY | Facility: HOSPITAL | Age: 63
DRG: 175 | End: 2023-11-28
Payer: MEDICARE

## 2023-11-28 LAB
ALBUMIN FLD-MCNC: <0.5 G/DL
ALBUMIN SERPL BCP-MCNC: 2 G/DL (ref 3.4–5)
ALP SERPL-CCNC: 118 U/L (ref 33–136)
ALT SERPL W P-5'-P-CCNC: 16 U/L (ref 10–52)
ANION GAP SERPL CALC-SCNC: 14 MMOL/L (ref 10–20)
AST SERPL W P-5'-P-CCNC: 37 U/L (ref 9–39)
BASOPHILS # BLD AUTO: 0.02 X10*3/UL (ref 0–0.1)
BASOPHILS NFR BLD AUTO: 0.2 %
BASOPHILS NFR FLD MANUAL: 0 %
BILIRUB DIRECT SERPL-MCNC: 0.1 MG/DL (ref 0–0.3)
BILIRUB SERPL-MCNC: 0.6 MG/DL (ref 0–1.2)
BLASTS NFR FLD MANUAL: 0 %
BUN SERPL-MCNC: 5 MG/DL (ref 6–23)
CALCIUM SERPL-MCNC: 7.6 MG/DL (ref 8.6–10.6)
CHLORIDE SERPL-SCNC: 98 MMOL/L (ref 98–107)
CLARITY FLD: ABNORMAL
CO2 SERPL-SCNC: 25 MMOL/L (ref 21–32)
COLOR FLD: ABNORMAL
CREAT SERPL-MCNC: 0.73 MG/DL (ref 0.5–1.3)
EOSINOPHIL # BLD AUTO: 0.01 X10*3/UL (ref 0–0.7)
EOSINOPHIL NFR BLD AUTO: 0.1 %
EOSINOPHIL NFR FLD MANUAL: 0 %
ERYTHROCYTE [DISTWIDTH] IN BLOOD BY AUTOMATED COUNT: 18.5 % (ref 11.5–14.5)
GFR SERPL CREATININE-BSD FRML MDRD: >90 ML/MIN/1.73M*2
GLUCOSE SERPL-MCNC: 96 MG/DL (ref 74–99)
HCT VFR BLD AUTO: 33 % (ref 41–52)
HGB BLD-MCNC: 10.5 G/DL (ref 13.5–17.5)
IMM GRANULOCYTES # BLD AUTO: 0.03 X10*3/UL (ref 0–0.7)
IMM GRANULOCYTES NFR BLD AUTO: 0.4 % (ref 0–0.9)
IMMATURE GRANULOCYTES IN FLUID: 0 %
INR PPP: 1.1 (ref 0.9–1.1)
KAPPA LC SERPL-MCNC: 7.53 MG/DL (ref 0.33–1.94)
KAPPA LC/LAMBDA SER: 1.61 {RATIO} (ref 0.26–1.65)
LACTOFERRIN STL QL IA: POSITIVE
LAMBDA LC SERPL-MCNC: 4.69 MG/DL (ref 0.57–2.63)
LYMPHOCYTES # BLD AUTO: 2.68 X10*3/UL (ref 1.2–4.8)
LYMPHOCYTES NFR BLD AUTO: 33 %
LYMPHOCYTES NFR FLD MANUAL: 8 %
MAGNESIUM SERPL-MCNC: 2.21 MG/DL (ref 1.6–2.4)
MCH RBC QN AUTO: 36.2 PG (ref 26–34)
MCHC RBC AUTO-ENTMCNC: 31.8 G/DL (ref 32–36)
MCV RBC AUTO: 114 FL (ref 80–100)
MONOCYTES # BLD AUTO: 0.68 X10*3/UL (ref 0.1–1)
MONOCYTES NFR BLD AUTO: 8.4 %
MONOS+MACROS NFR FLD MANUAL: 86 %
NEUTROPHILS # BLD AUTO: 4.7 X10*3/UL (ref 1.2–7.7)
NEUTROPHILS NFR BLD AUTO: 57.9 %
NEUTROPHILS NFR FLD MANUAL: 6 %
NRBC BLD-RTO: 0.2 /100 WBCS (ref 0–0)
OTHER CELLS NFR FLD MANUAL: 0 %
PLASMA CELLS NFR FLD MANUAL: 0 %
PLATELET # BLD AUTO: 178 X10*3/UL (ref 150–450)
POTASSIUM SERPL-SCNC: 4 MMOL/L (ref 3.5–5.3)
PROT FLD-MCNC: 0.5 G/DL
PROT SERPL-MCNC: 5.2 G/DL (ref 6.4–8.2)
PROTHROMBIN TIME: 12.2 SECONDS (ref 9.8–12.8)
RBC # BLD AUTO: 2.9 X10*6/UL (ref 4.5–5.9)
RBC # FLD AUTO: 0 /UL
SODIUM SERPL-SCNC: 133 MMOL/L (ref 136–145)
TOTAL CELLS COUNTED PRT: 100
UFH PPP CHRO-ACNC: 0.6 IU/ML
WBC # BLD AUTO: 8.1 X10*3/UL (ref 4.4–11.3)
WBC # FLD AUTO: 45 /UL

## 2023-11-28 PROCEDURE — 85610 PROTHROMBIN TIME: CPT

## 2023-11-28 PROCEDURE — 80053 COMPREHEN METABOLIC PANEL: CPT

## 2023-11-28 PROCEDURE — 49083 ABD PARACENTESIS W/IMAGING: CPT | Mod: GC | Performed by: RADIOLOGY

## 2023-11-28 PROCEDURE — 1100000001 HC PRIVATE ROOM DAILY

## 2023-11-28 PROCEDURE — 2500000001 HC RX 250 WO HCPCS SELF ADMINISTERED DRUGS (ALT 637 FOR MEDICARE OP)

## 2023-11-28 PROCEDURE — 2500000001 HC RX 250 WO HCPCS SELF ADMINISTERED DRUGS (ALT 637 FOR MEDICARE OP): Performed by: STUDENT IN AN ORGANIZED HEALTH CARE EDUCATION/TRAINING PROGRAM

## 2023-11-28 PROCEDURE — 2500000004 HC RX 250 GENERAL PHARMACY W/ HCPCS (ALT 636 FOR OP/ED)

## 2023-11-28 PROCEDURE — 82248 BILIRUBIN DIRECT: CPT

## 2023-11-28 PROCEDURE — 85025 COMPLETE CBC W/AUTO DIFF WBC: CPT

## 2023-11-28 PROCEDURE — 2500000002 HC RX 250 W HCPCS SELF ADMINISTERED DRUGS (ALT 637 FOR MEDICARE OP, ALT 636 FOR OP/ED)

## 2023-11-28 PROCEDURE — 89051 BODY FLUID CELL COUNT: CPT

## 2023-11-28 PROCEDURE — 36415 COLL VENOUS BLD VENIPUNCTURE: CPT

## 2023-11-28 PROCEDURE — S4991 NICOTINE PATCH NONLEGEND: HCPCS

## 2023-11-28 PROCEDURE — 82042 OTHER SOURCE ALBUMIN QUAN EA: CPT

## 2023-11-28 PROCEDURE — P9047 ALBUMIN (HUMAN), 25%, 50ML: HCPCS | Mod: JZ

## 2023-11-28 PROCEDURE — 85520 HEPARIN ASSAY: CPT

## 2023-11-28 PROCEDURE — 87070 CULTURE OTHR SPECIMN AEROBIC: CPT

## 2023-11-28 PROCEDURE — 99233 SBSQ HOSP IP/OBS HIGH 50: CPT | Performed by: INTERNAL MEDICINE

## 2023-11-28 PROCEDURE — 49083 ABD PARACENTESIS W/IMAGING: CPT

## 2023-11-28 PROCEDURE — 0W9G30Z DRAINAGE OF PERITONEAL CAVITY WITH DRAINAGE DEVICE, PERCUTANEOUS APPROACH: ICD-10-PCS | Performed by: RADIOLOGY

## 2023-11-28 PROCEDURE — 83735 ASSAY OF MAGNESIUM: CPT

## 2023-11-28 PROCEDURE — 2500000004 HC RX 250 GENERAL PHARMACY W/ HCPCS (ALT 636 FOR OP/ED): Mod: JZ

## 2023-11-28 PROCEDURE — 84157 ASSAY OF PROTEIN OTHER: CPT

## 2023-11-28 PROCEDURE — 49083 ABD PARACENTESIS W/IMAGING: CPT | Performed by: PHYSICIAN ASSISTANT

## 2023-11-28 RX ORDER — ALBUMIN HUMAN 250 G/1000ML
25 SOLUTION INTRAVENOUS ONCE
Status: COMPLETED | OUTPATIENT
Start: 2023-11-28 | End: 2023-11-28

## 2023-11-28 RX ORDER — TRAZODONE HYDROCHLORIDE 50 MG/1
50 TABLET ORAL NIGHTLY PRN
Status: DISCONTINUED | OUTPATIENT
Start: 2023-11-28 | End: 2023-11-29

## 2023-11-28 RX ORDER — TALC
3 POWDER (GRAM) TOPICAL NIGHTLY
Status: DISCONTINUED | OUTPATIENT
Start: 2023-11-28 | End: 2023-11-29 | Stop reason: DRUGHIGH

## 2023-11-28 RX ORDER — LANOLIN ALCOHOL/MO/W.PET/CERES
100 CREAM (GRAM) TOPICAL DAILY
Status: DISCONTINUED | OUTPATIENT
Start: 2023-11-28 | End: 2023-12-01 | Stop reason: HOSPADM

## 2023-11-28 RX ADMIN — RISPERIDONE 1 MG: 1 TABLET ORAL at 20:55

## 2023-11-28 RX ADMIN — HEPARIN SODIUM 1000 UNITS/HR: 10000 INJECTION, SOLUTION INTRAVENOUS at 04:31

## 2023-11-28 RX ADMIN — PANTOPRAZOLE SODIUM 40 MG: 40 TABLET, DELAYED RELEASE ORAL at 08:43

## 2023-11-28 RX ADMIN — APIXABAN 10 MG: 5 TABLET, FILM COATED ORAL at 20:54

## 2023-11-28 RX ADMIN — Medication 1 PATCH: at 08:43

## 2023-11-28 RX ADMIN — CEFTRIAXONE SODIUM 1 G: 1 INJECTION, SOLUTION INTRAVENOUS at 12:23

## 2023-11-28 RX ADMIN — Medication 1 TABLET: at 08:43

## 2023-11-28 RX ADMIN — ALBUMIN HUMAN 25 G: 0.25 SOLUTION INTRAVENOUS at 15:09

## 2023-11-28 RX ADMIN — THIAMINE HCL TAB 100 MG 100 MG: 100 TAB at 08:47

## 2023-11-28 RX ADMIN — RISPERIDONE 1 MG: 1 TABLET ORAL at 08:43

## 2023-11-28 RX ADMIN — FOLIC ACID 1 MG: 1 TABLET ORAL at 08:43

## 2023-11-28 RX ADMIN — Medication 3 MG: at 20:55

## 2023-11-28 RX ADMIN — ACETAMINOPHEN 650 MG: 325 TABLET ORAL at 08:43

## 2023-11-28 RX ADMIN — LACTULOSE 20 G: 20 SOLUTION ORAL at 08:43

## 2023-11-28 ASSESSMENT — COGNITIVE AND FUNCTIONAL STATUS - GENERAL
DAILY ACTIVITIY SCORE: 24
MOBILITY SCORE: 23
MOBILITY SCORE: 23
DAILY ACTIVITIY SCORE: 24
CLIMB 3 TO 5 STEPS WITH RAILING: A LITTLE
CLIMB 3 TO 5 STEPS WITH RAILING: A LITTLE

## 2023-11-28 ASSESSMENT — PAIN - FUNCTIONAL ASSESSMENT
PAIN_FUNCTIONAL_ASSESSMENT: 0-10
PAIN_FUNCTIONAL_ASSESSMENT: 0-10

## 2023-11-28 ASSESSMENT — LIFESTYLE VARIABLES
AGITATION: NORMAL ACTIVITY
BLOOD PRESSURE: 117/86
ANXIETY: NO ANXIETY, AT EASE
NAUSEA AND VOMITING: NO NAUSEA AND NO VOMITING
TREMOR: NO TREMOR
VISUAL DISTURBANCES: NOT PRESENT
HEADACHE, FULLNESS IN HEAD: NOT PRESENT
TOTAL SCORE: 0
AUDITORY DISTURBANCES: NOT PRESENT
PAROXYSMAL SWEATS: NO SWEAT VISIBLE
ORIENTATION AND CLOUDING OF SENSORIUM: ORIENTED AND CAN DO SERIAL ADDITIONS

## 2023-11-28 ASSESSMENT — PAIN SCALES - GENERAL
PAINLEVEL_OUTOF10: 0 - NO PAIN
PAINLEVEL_OUTOF10: 3

## 2023-11-28 NOTE — POST-PROCEDURE NOTE
A time out was performed and Left Hemiabdomen was examined with US and appropriate entry point was confirmed and marked.  The patient was prepped and draped in a sterile manner, 1% lidocaine was used to anesthesize the skin and subcutaneous tissue. A 5F Centesis needle was then introduced through the skin into the peritoneal space, the centesis catheter was then threaded without difficulty. 3500 ml of yellow fluid was removed without difficulty. The catheter was then removed. No immediate complications were noted during and immediately following the procedure.

## 2023-11-28 NOTE — CARE PLAN
The patient's goals for the shift include having decreased abdominal and back pain.     The clinical goals for the shift include Pt will have his paracentesis by the end of this shift.    Over the shift, the patient did make progress toward the following goals. He had his paracentesis completed with no Barriers to progression include and is reporting improved pain.

## 2023-11-28 NOTE — PROGRESS NOTES
Pt's CM from [facility] ([phone]) confirmed pt's PCP is Dr. Adwoa Castillo. Fax# [phone]. Per pt's CM, would like to be notified when pt will be discharged. [facility] will continue to follow.

## 2023-11-28 NOTE — PROGRESS NOTES
Physical Therapy                 Therapy Communication Note    Patient Name: Junito Rodríguez  MRN: [id number]  Today's Date: 11/28/2023     Discipline: Physical Therapy    Missed Visit Reason: Missed Visit Reason: Other (Comment)    Missed Time: Attempt    Comment:  PT eval attempted; pt reports he has been up indep in his room and ambulated length of rust twice, has no therapy concerns. Educated to re-order PT if pt experiences any mobility concerns during admission.

## 2023-11-28 NOTE — PROGRESS NOTES
Junito Rodríguez is a 62 y.o. male on day 4 of admission presenting with Acute pulmonary embolism without acute cor pulmonale, unspecified pulmonary embolism type (CMS/HCC).    Subjective   Junito Rodríguez had an acute event of tachycardia (as high as 109) yesterday afternoon, for which our team was paged regarding ativan administration while scoring 0 on CIWA. Mr. Rodríguez has maintained at a HR in the low 100s or high 90s. His CIWA score has maintained at a 0 for over 4 days. Ativan was held at that time with plan to monitor vitals. Mr. Rodríguez reports difficulty sleeping, reporting sleeping periods of 1-2 hours interrupted by pain. Mr. Rodríguez reports that he is able to ambulate without cardiovascular difficulty. He reports one bowel movement a day, formed and without blood.    He was advised to limit fluids to 1.6L to prevent volume overload. After clarifying with IR, we resumed his heparin gtt and low sodium diet. He states he has been updating family via phone calls and will ask the team to speak to family should any medical questions arise.     Mr. Rodríguez reported several pertinent negatives. He denied changes in the quality, location or radiation of GI pain. He denied diarrhea, constipation, blood, dark coloration, or mucus in stool, vomiting, bloody or mucous oral secretions. He denied difficulty breathing at rest or upon exertion during periods of ambulation. He denied palpitations or chest pain.    He has had continued abdominal pain and minimal SOB, consistent with days prior.   He shared that his wife came into this hospital 10 years ago with a cold, and unfortunately passed away at that hospital visit secondary to pneumonia. He is fearful that something similar will happen to him but knows we are taking care of him to the best of our ability.        Current Facility-Administered Medications:     acetaminophen (Tylenol) tablet 650 mg, 650 mg, oral, q4h PRN, Grady Rodriguez MD, 650 mg at 11/28/23 0843    albuterol 2.5 mg /3  mL (0.083 %) nebulizer solution 2.5 mg, 2.5 mg, nebulization, q6h PRN, Grady Rodriguez MD    cefTRIAXone (Rocephin) IVPB 1 g, 1 g, intravenous, q24h, Sesar Rowland MD PhD, Stopped at 11/27/23 1258    diclofenac sodium (Voltaren) 1 % gel 1 Application, 4 g, Topical, 4x daily PRN, eSsar Rowland MD PhD, 1 Application at 11/26/23 1801    fluticasone furoate-vilanteroL (Breo Ellipta) 200-25 mcg/dose inhaler 1 puff, 1 puff, inhalation, Daily, Grady Rodriguez MD    folic acid (Folvite) tablet 1 mg, 1 mg, oral, Daily, Grady Rodriguez MD, 1 mg at 11/28/23 0843    heparin (porcine) injection 2,000-4,000 Units, 2,000-4,000 Units, intravenous, q4h PRN, Grady Rodriguez MD    heparin 25,000 Units in dextrose 5% 250 mL (100 Units/mL) infusion (premix), 0-4,500 Units/hr, intravenous, Continuous, Grady Rodriguez MD, Last Rate: 10 mL/hr at 11/28/23 0431, 1,000 Units/hr at 11/28/23 0431    lactulose 20 gram/30 mL oral solution 20 g, 20 g, oral, Daily, Grady Rodriguez MD, 20 g at 11/28/23 0843    melatonin tablet 3 mg, 3 mg, oral, Nightly, Ana Ennis MD    multivitamin with minerals 1 tablet, 1 tablet, oral, Daily, Grady Rodriguez MD, 1 tablet at 11/28/23 0843    nicotine (Nicoderm CQ) 14 mg/24 hr patch 1 patch, 1 patch, transdermal, Daily, Grady Rodriguez MD, 1 patch at 11/28/23 0843    ondansetron (Zofran) tablet 4 mg, 4 mg, oral, q8h PRN, Ana Ennis MD, 4 mg at 11/27/23 1228    pantoprazole (ProtoNix) EC tablet 40 mg, 40 mg, oral, Daily before breakfast, Grady Rodriguez MD, 40 mg at 11/28/23 0843    risperiDONE (RisperDAL) tablet 1 mg, 1 mg, oral, BID, Grady Rodriguez MD, 1 mg at 11/28/23 0843    sulfur hexafluoride microsphr (Lumason) injection 24.28 mg, 2 mL, intravenous, Once in imaging, Grady Rodriguez MD    thiamine (Vitamin B-1) tablet 100 mg, 100 mg, oral, Daily, Ana Ennis MD, 100 mg at 11/28/23 0847    traZODone (Desyrel) tablet 50 mg, 50 mg, oral, Nightly PRN, Ana Ennis MD      Objective   Physical Exam:  General:  "alert and in no apparent distress. Speaks coherently and without difficulty  HEENT: normocephalic, atraumatic, EOMI, no scleral icterus  CV: RRR, no murmurs or gallops  Pulm: no wheezes or crackles, work of breathing is appropriate  Abd: firm, distended, noticeable ascites, mild tenderness to palpation diffusely, positive shifting dullness with fluid wave  : no jackson  Skin: no rashes, no spider angiomas seen   Neuro: moving all extremities  Psych: normal affect and cognition    Last Recorded Vitals  Blood pressure 117/84, pulse 102, temperature 36.6 °C (97.9 °F), resp. rate 18, height 1.753 m (5' 9\"), weight 49.9 kg (110 lb), SpO2 99 %.  Intake/Output last 3 Shifts:  I/O last 3 completed shifts:  In: 453.1 (9.1 mL/kg) [I.V.:353.1 (7.1 mL/kg); IV Piggyback:100]  Out: 500 (10 mL/kg) [Urine:500 (0.3 mL/kg/hr)]  Weight: 49.9 kg         Relevant Results       Lab Results   Component Value Date    WBC 8.1 11/28/2023    HGB 10.5 (L) 11/28/2023    HCT 33.0 (L) 11/28/2023     (H) 11/28/2023     11/28/2023     Lab Results   Component Value Date    GLUCOSE 100 (H) 11/27/2023    CALCIUM 7.4 (L) 11/27/2023     (L) 11/27/2023    K 3.4 (L) 11/27/2023    CO2 30 11/27/2023    CL 98 11/27/2023    BUN 4 (L) 11/27/2023    CREATININE 0.77 11/27/2023     Lab Results   Component Value Date    ALKPHOS 97 11/27/2023    ALT 14 11/27/2023    AST 27 11/27/2023    PROTIME 12.2 11/28/2023    INR 1.1 11/28/2023     MELD 3.0: 12 at 11/28/2023  8:11 AM  MELD-Na: 7 at 11/28/2023  8:11 AM  Calculated from:  Serum Creatinine: 0.77 mg/dL (Using min of 1 mg/dL) at 11/27/2023 10:26 AM  Serum Sodium: 135 mmol/L at 11/27/2023 10:26 AM  Total Bilirubin: 0.5 mg/dL (Using min of 1 mg/dL) at 11/27/2023 10:26 AM  Serum Albumin: 1.8 g/dL at 11/27/2023 10:26 AM  INR(ratio): 1.1 at 11/28/2023  8:11 AM  Age at listing (hypothetical): 62 years  Sex: Male at 11/28/2023  8:11 AM        Assessment/Plan   Principal Problem:    Acute pulmonary " embolism without acute cor pulmonale, unspecified pulmonary embolism type (CMS/HCC)    Junito Rodríguez is a 62 y.o. male PMHx of schizophrenia, depression, anxiety, COPD (PFTs not on file), alcohol use disorder, significant tobacco use, and chronic lower back pain.  Patient presented to the ED with 7-day history of leg swelling, abdominal swelling, shortness of breath, and anorexia. Diagnosed with decompensated cirrhosis likely due to alcohol use and bilateral PEs without evidence of right heart strain or hypoxia, extensive R lower extremity DVTs, and IVC thrombus likely provoked in the setting of decreased mobility.     Today:  -Para per IR, fu ascitic fluid analysis for SBP. Will give albumin following para  - Will transition heparin gtt to Apixaban following para  -Orthostatic vitals  -Breo Ellipta to be resumed today after contact w/ pharmacy and nursing staff yesterday. Follow up on administration   -SPEP, multiple myeloma labs  -Start Melatonin 3 mg daily. Trazadone 50 mg PRN  -CIWA discontinued  -Thiamine PO following IV regimen expiration  - PT/OT    #Bilateral PE's  #Dyspnea on exertion  :: Evident on CT PE  ::Multiple bilateral pulmonary emboli, right-greater-than-left with  the largest embolism located in the right middle lobar artery.  Additional involvement of the right upper and lower lobes and  segmental/subsegmental involvement in the left lower lobe.  -DVT ultrasound 11/25 with R prox fem vein through popliteal, posterior tibial, and peroneal thrombus. No evidence of clot in the left leg   :: Trop:30 Lactate:2.4  BNP: 54  -continue heparin gtt for anticoagulation. Anticipate transitioning to DOAC   -Echo 11/25 with hyperdynamic EF and no evidence of RV strain     #Massive ascites  #Decompensated cirrhosis likely alcohol related   :: Meld 3.0 12  -US doppler of liver with cirrhotic morphology, no evidence of thrombus in the hepatic vasculature   -CTX 1 g daily for SBP prophylaxis given possible  enterocolitis seen on imaging and abd pain   - Continue Lactulose q day  -Pt counseled on alcohol cessation and reports understanding   [] IR para today  [] F/U fluid analysis, can DC ctx if no SBP  [] Will evaluate BP after para for diuretic initiation  [] Scheduled lactulose, not titrating to BM     #Possible enterocolitis- resolved  #Weight loss, change in appetite  -no further diarrhea  -continuing empiric abx as above   -stool pathogen PCR negative    #COPD  -Continue home albuterol and Breo Ellipta. Follow up on administration      #Depression  #Anxiety  -Continue home risperidone 1 mg twice daily     #Alcohol abuse  -CIWA protocol discontinued  -Folate supplementation and multivitamin  -Folate and B12 level  -Thiamine PO after current IV regimen expires     #Chronic Tobacco use  - Nicotine patch      #Pain-related insomnia  -Melatonin 3 mg daily  -Trazadone 50 mg PRN    F: PRN  E: k>4 Mg>2  N: low sodium diet, 1.6L fluid restriction   GI ppx: PPI  DVT ppx: heparin gtt, transition to apixaban following para    Dispo: pending PT/OT eval     Full code  Next of kin: Daughter Sukhdeep) : [phone]       José Brando, [facility] GABO MS3    I have examined the patient with the medical student and reviewed their excellent note, making edits as appropriate.   Adriano Braun MD  Internal Medicine, PGY-1

## 2023-11-29 LAB
ALBUMIN SERPL BCP-MCNC: 2 G/DL (ref 3.4–5)
ALP SERPL-CCNC: 80 U/L (ref 33–136)
ALT SERPL W P-5'-P-CCNC: 11 U/L (ref 10–52)
AMPHETAMINES UR QL SCN: ABNORMAL
ANA PATTERN: ABNORMAL
ANA SER QL HEP2 SUBST: POSITIVE
ANA TITR SER IF: ABNORMAL {TITER}
ANION GAP SERPL CALC-SCNC: 10 MMOL/L (ref 10–20)
AST SERPL W P-5'-P-CCNC: 29 U/L (ref 9–39)
BARBITURATES UR QL SCN: ABNORMAL
BASOPHILS # BLD AUTO: 0.01 X10*3/UL (ref 0–0.1)
BASOPHILS NFR BLD AUTO: 0.1 %
BENZODIAZ UR QL SCN: ABNORMAL
BILIRUB DIRECT SERPL-MCNC: 0.2 MG/DL (ref 0–0.3)
BILIRUB SERPL-MCNC: 0.6 MG/DL (ref 0–1.2)
BUN SERPL-MCNC: 4 MG/DL (ref 6–23)
BZE UR QL SCN: ABNORMAL
CALCIUM SERPL-MCNC: 7.6 MG/DL (ref 8.6–10.6)
CALPROTECTIN STL-MCNT: 230 UG/G
CANNABINOIDS UR QL SCN: ABNORMAL
CENTROMERE B AB SER-ACNC: <0.2 AI
CHLORIDE SERPL-SCNC: 102 MMOL/L (ref 98–107)
CHROMATIN AB SERPL-ACNC: <0.2 AI
CO2 SERPL-SCNC: 28 MMOL/L (ref 21–32)
CREAT SERPL-MCNC: 0.8 MG/DL (ref 0.5–1.3)
DSDNA AB SER-ACNC: 2 IU/ML
ENA JO1 AB SER QL IA: <0.2 AI
ENA RNP AB SER IA-ACNC: 1.1 AI
ENA SCL70 AB SER QL IA: <0.2 AI
ENA SM AB SER IA-ACNC: <0.2 AI
ENA SM+RNP AB SER QL IA: <0.2 AI
ENA SS-A AB SER IA-ACNC: 0.2 AI
ENA SS-B AB SER IA-ACNC: <0.2 AI
EOSINOPHIL # BLD AUTO: 0.02 X10*3/UL (ref 0–0.7)
EOSINOPHIL NFR BLD AUTO: 0.3 %
ERYTHROCYTE [DISTWIDTH] IN BLOOD BY AUTOMATED COUNT: 18.4 % (ref 11.5–14.5)
FENTANYL+NORFENTANYL UR QL SCN: ABNORMAL
GFR SERPL CREATININE-BSD FRML MDRD: >90 ML/MIN/1.73M*2
GLUCOSE SERPL-MCNC: 85 MG/DL (ref 74–99)
HCT VFR BLD AUTO: 24.3 % (ref 41–52)
HGB BLD-MCNC: 8.4 G/DL (ref 13.5–17.5)
IMM GRANULOCYTES # BLD AUTO: 0.03 X10*3/UL (ref 0–0.7)
IMM GRANULOCYTES NFR BLD AUTO: 0.4 % (ref 0–0.9)
INR PPP: 1.5 (ref 0.9–1.1)
LYMPHOCYTES # BLD AUTO: 2.14 X10*3/UL (ref 1.2–4.8)
LYMPHOCYTES NFR BLD AUTO: 30.4 %
MAGNESIUM SERPL-MCNC: 1.71 MG/DL (ref 1.6–2.4)
MCH RBC QN AUTO: 35.6 PG (ref 26–34)
MCHC RBC AUTO-ENTMCNC: 34.6 G/DL (ref 32–36)
MCV RBC AUTO: 103 FL (ref 80–100)
MITOCHONDRIA AB SER QL IF: NEGATIVE
MONOCYTES # BLD AUTO: 0.62 X10*3/UL (ref 0.1–1)
MONOCYTES NFR BLD AUTO: 8.8 %
NEUTROPHILS # BLD AUTO: 4.21 X10*3/UL (ref 1.2–7.7)
NEUTROPHILS NFR BLD AUTO: 60 %
NRBC BLD-RTO: 0 /100 WBCS (ref 0–0)
OPIATES UR QL SCN: ABNORMAL
OXYCODONE+OXYMORPHONE UR QL SCN: ABNORMAL
PCP UR QL SCN: ABNORMAL
PLATELET # BLD AUTO: 189 X10*3/UL (ref 150–450)
POTASSIUM SERPL-SCNC: 4 MMOL/L (ref 3.5–5.3)
PROT SERPL-MCNC: 4.4 G/DL (ref 6.4–8.2)
PROTHROMBIN TIME: 16.8 SECONDS (ref 9.8–12.8)
RBC # BLD AUTO: 2.36 X10*6/UL (ref 4.5–5.9)
RIBOSOMAL P AB SER-ACNC: <0.2 AI
SMOOTH MUSCLE AB SER QL IF: NEGATIVE
SODIUM SERPL-SCNC: 136 MMOL/L (ref 136–145)
WBC # BLD AUTO: 7 X10*3/UL (ref 4.4–11.3)

## 2023-11-29 PROCEDURE — 2500000004 HC RX 250 GENERAL PHARMACY W/ HCPCS (ALT 636 FOR OP/ED)

## 2023-11-29 PROCEDURE — 36415 COLL VENOUS BLD VENIPUNCTURE: CPT

## 2023-11-29 PROCEDURE — 80048 BASIC METABOLIC PNL TOTAL CA: CPT

## 2023-11-29 PROCEDURE — 93010 ELECTROCARDIOGRAM REPORT: CPT | Performed by: INTERNAL MEDICINE

## 2023-11-29 PROCEDURE — 1100000001 HC PRIVATE ROOM DAILY

## 2023-11-29 PROCEDURE — 82248 BILIRUBIN DIRECT: CPT

## 2023-11-29 PROCEDURE — 85610 PROTHROMBIN TIME: CPT

## 2023-11-29 PROCEDURE — 2500000001 HC RX 250 WO HCPCS SELF ADMINISTERED DRUGS (ALT 637 FOR MEDICARE OP): Performed by: INTERNAL MEDICINE

## 2023-11-29 PROCEDURE — 2500000001 HC RX 250 WO HCPCS SELF ADMINISTERED DRUGS (ALT 637 FOR MEDICARE OP): Performed by: STUDENT IN AN ORGANIZED HEALTH CARE EDUCATION/TRAINING PROGRAM

## 2023-11-29 PROCEDURE — 99233 SBSQ HOSP IP/OBS HIGH 50: CPT | Performed by: INTERNAL MEDICINE

## 2023-11-29 PROCEDURE — 2500000002 HC RX 250 W HCPCS SELF ADMINISTERED DRUGS (ALT 637 FOR MEDICARE OP, ALT 636 FOR OP/ED)

## 2023-11-29 PROCEDURE — 85025 COMPLETE CBC W/AUTO DIFF WBC: CPT

## 2023-11-29 PROCEDURE — S4991 NICOTINE PATCH NONLEGEND: HCPCS

## 2023-11-29 PROCEDURE — 83735 ASSAY OF MAGNESIUM: CPT

## 2023-11-29 PROCEDURE — 2500000001 HC RX 250 WO HCPCS SELF ADMINISTERED DRUGS (ALT 637 FOR MEDICARE OP)

## 2023-11-29 PROCEDURE — 94640 AIRWAY INHALATION TREATMENT: CPT

## 2023-11-29 RX ORDER — GABAPENTIN 300 MG/1
300 CAPSULE ORAL NIGHTLY
Status: DISCONTINUED | OUTPATIENT
Start: 2023-11-29 | End: 2023-12-01 | Stop reason: HOSPADM

## 2023-11-29 RX ORDER — TRAZODONE HYDROCHLORIDE 50 MG/1
50 TABLET ORAL NIGHTLY
Status: DISCONTINUED | OUTPATIENT
Start: 2023-11-29 | End: 2023-12-01 | Stop reason: HOSPADM

## 2023-11-29 RX ORDER — FUROSEMIDE 20 MG/1
20 TABLET ORAL DAILY
Status: DISCONTINUED | OUTPATIENT
Start: 2023-11-29 | End: 2023-12-01 | Stop reason: HOSPADM

## 2023-11-29 RX ORDER — LANOLIN ALCOHOL/MO/W.PET/CERES
400 CREAM (GRAM) TOPICAL ONCE
Status: COMPLETED | OUTPATIENT
Start: 2023-11-29 | End: 2023-11-29

## 2023-11-29 RX ORDER — ACETAMINOPHEN 500 MG
5 TABLET ORAL EVERY EVENING
Status: DISCONTINUED | OUTPATIENT
Start: 2023-11-29 | End: 2023-12-01 | Stop reason: HOSPADM

## 2023-11-29 RX ORDER — MIDODRINE HYDROCHLORIDE 5 MG/1
5 TABLET ORAL
Status: DISCONTINUED | OUTPATIENT
Start: 2023-11-29 | End: 2023-12-01 | Stop reason: HOSPADM

## 2023-11-29 RX ADMIN — Medication 1 TABLET: at 08:37

## 2023-11-29 RX ADMIN — LACTULOSE 20 G: 20 SOLUTION ORAL at 08:37

## 2023-11-29 RX ADMIN — PANTOPRAZOLE SODIUM 40 MG: 40 TABLET, DELAYED RELEASE ORAL at 08:37

## 2023-11-29 RX ADMIN — FOLIC ACID 1 MG: 1 TABLET ORAL at 08:38

## 2023-11-29 RX ADMIN — APIXABAN 10 MG: 5 TABLET, FILM COATED ORAL at 08:38

## 2023-11-29 RX ADMIN — ACETAMINOPHEN 650 MG: 325 TABLET ORAL at 08:39

## 2023-11-29 RX ADMIN — MIDODRINE HYDROCHLORIDE 5 MG: 5 TABLET ORAL at 17:06

## 2023-11-29 RX ADMIN — GABAPENTIN 300 MG: 300 CAPSULE ORAL at 20:27

## 2023-11-29 RX ADMIN — Medication 400 MG: at 12:58

## 2023-11-29 RX ADMIN — MIDODRINE HYDROCHLORIDE 5 MG: 5 TABLET ORAL at 11:37

## 2023-11-29 RX ADMIN — APIXABAN 10 MG: 5 TABLET, FILM COATED ORAL at 20:27

## 2023-11-29 RX ADMIN — TRAZODONE HYDROCHLORIDE 50 MG: 50 TABLET ORAL at 20:27

## 2023-11-29 RX ADMIN — FUROSEMIDE 20 MG: 20 TABLET ORAL at 11:37

## 2023-11-29 RX ADMIN — FLUTICASONE FUROATE AND VILANTEROL TRIFENATATE 1 PUFF: 200; 25 POWDER RESPIRATORY (INHALATION) at 08:28

## 2023-11-29 RX ADMIN — THIAMINE HCL TAB 100 MG 100 MG: 100 TAB at 08:38

## 2023-11-29 RX ADMIN — RISPERIDONE 1 MG: 1 TABLET ORAL at 08:37

## 2023-11-29 RX ADMIN — Medication 5 MG: at 20:27

## 2023-11-29 RX ADMIN — RISPERIDONE 1 MG: 1 TABLET ORAL at 20:28

## 2023-11-29 RX ADMIN — Medication 1 PATCH: at 08:40

## 2023-11-29 SDOH — HEALTH STABILITY: PHYSICAL HEALTH: ON AVERAGE, HOW MANY DAYS PER WEEK DO YOU ENGAGE IN MODERATE TO STRENUOUS EXERCISE (LIKE A BRISK WALK)?: 2 DAYS

## 2023-11-29 SDOH — ECONOMIC STABILITY: HOUSING INSECURITY: IN THE LAST 12 MONTHS, HOW MANY PLACES HAVE YOU LIVED?: 1

## 2023-11-29 SDOH — SOCIAL STABILITY: SOCIAL INSECURITY: HAVE YOU HAD THOUGHTS OF HARMING ANYONE ELSE?: NO

## 2023-11-29 SDOH — ECONOMIC STABILITY: INCOME INSECURITY: IN THE PAST 12 MONTHS, HAS THE ELECTRIC, GAS, OIL, OR WATER COMPANY THREATENED TO SHUT OFF SERVICE IN YOUR HOME?: NO

## 2023-11-29 SDOH — HEALTH STABILITY: PHYSICAL HEALTH: ON AVERAGE, HOW MANY MINUTES DO YOU ENGAGE IN EXERCISE AT THIS LEVEL?: 10 MIN

## 2023-11-29 ASSESSMENT — PAIN SCALES - GENERAL
PAINLEVEL_OUTOF10: 9
PAINLEVEL_OUTOF10: 8
PAINLEVEL_OUTOF10: 0 - NO PAIN

## 2023-11-29 ASSESSMENT — LIFESTYLE VARIABLES
HOW OFTEN DO YOU HAVE 6 OR MORE DRINKS ON ONE OCCASION: DAILY OR ALMOST DAILY
SKIP TO QUESTIONS 9-10: 0
HOW MANY STANDARD DRINKS CONTAINING ALCOHOL DO YOU HAVE ON A TYPICAL DAY: 5 OR 6
HOW OFTEN DO YOU HAVE A DRINK CONTAINING ALCOHOL: 4 OR MORE TIMES A WEEK
AUDIT-C TOTAL SCORE: 10
PRESCIPTION_ABUSE_PAST_12_MONTHS: NO
AUDIT-C TOTAL SCORE: 10

## 2023-11-29 ASSESSMENT — PATIENT HEALTH QUESTIONNAIRE - PHQ9
2. FEELING DOWN, DEPRESSED OR HOPELESS: NOT AT ALL
1. LITTLE INTEREST OR PLEASURE IN DOING THINGS: NOT AT ALL
SUM OF ALL RESPONSES TO PHQ9 QUESTIONS 1 & 2: 0

## 2023-11-29 ASSESSMENT — COGNITIVE AND FUNCTIONAL STATUS - GENERAL
CLIMB 3 TO 5 STEPS WITH RAILING: A LITTLE
MOBILITY SCORE: 23
DAILY ACTIVITIY SCORE: 24

## 2023-11-29 ASSESSMENT — PAIN DESCRIPTION - ORIENTATION: ORIENTATION: RIGHT;LEFT

## 2023-11-29 ASSESSMENT — PAIN - FUNCTIONAL ASSESSMENT: PAIN_FUNCTIONAL_ASSESSMENT: 0-10

## 2023-11-29 ASSESSMENT — PAIN DESCRIPTION - LOCATION: LOCATION: LEG

## 2023-11-29 ASSESSMENT — PAIN DESCRIPTION - DESCRIPTORS: DESCRIPTORS: ACHING

## 2023-11-29 NOTE — CARE PLAN
The patient's goals for the shift include      The clinical goals for the shift include Pt will have no c/o of pain discomfort or rate pain <4 thi shift

## 2023-11-29 NOTE — PROGRESS NOTES
Junito Rodríguez is a 62 y.o. male on day 5 of admission presenting with Acute pulmonary embolism without acute cor pulmonale, unspecified pulmonary embolism type (CMS/HCC).    Subjective   Junito Rodríguez complains of right ankle swelling and pain. He states that the pain started yesterday morning. He describes discomfort during his daily ambulation. He relates that he was able to walk the length of the hallway twice without cardiovascular difficulty. He denies difficulty breathing, wheezing, hemoptysis or any oral secretions. He denies chest pain, orthopnea, or difficulty breathing upon exertion. He denied fever, chills or generalized fatigue. He is mildly tachycardic () and relates that he felt some flutter after taking medications yesterday.     Mr. Rodríguez had a paracentesis procedure yesterday and describes diminished abdominal pain and discomfort. He denies any pain or discomfort around the insertion site. He denied any changes in the quality or coloration of the stool. He denied diffuse abdominal pain, urinary changes or stool frequency changes.    Mr. Rodríguez also complains of difficulty sleeping. He relates that the melatonin given yesterday did not help. He states that his difficulty sleeping is long standing, for which he was prescribed trazodone 6 months ago which did not help. He also described previous negative symptoms with xanax, which he related made him feel overly drowsy. He resumed taking Breo Ellipta yesterday, which he stated has helped.     Current Facility-Administered Medications:     acetaminophen (Tylenol) tablet 650 mg, 650 mg, oral, q4h PRN, Grady Rodriguez MD, 650 mg at 11/29/23 0839    albuterol 2.5 mg /3 mL (0.083 %) nebulizer solution 2.5 mg, 2.5 mg, nebulization, q6h PRN, Grady Rodriguez MD    apixaban (Eliquis) tablet 10 mg, 10 mg, oral, BID, 10 mg at 11/29/23 0838 **FOLLOWED BY** [START ON 12/5/2023] apixaban (Eliquis) tablet 5 mg, 5 mg, oral, BID, Adriano Braun MD    diclofenac sodium  (Voltaren) 1 % gel 1 Application, 4 g, Topical, 4x daily PRN, Sesar Rowland MD PhD, 1 Application at 11/26/23 1801    fluticasone furoate-vilanteroL (Breo Ellipta) 200-25 mcg/dose inhaler 1 puff, 1 puff, inhalation, Daily, Grady Rodriguez MD, 1 puff at 11/29/23 0828    folic acid (Folvite) tablet 1 mg, 1 mg, oral, Daily, Grady Rodriguez MD, 1 mg at 11/29/23 0838    furosemide (Lasix) tablet 20 mg, 20 mg, oral, Daily, Adriano Braun MD    gabapentin (Neurontin) capsule 300 mg, 300 mg, oral, Nightly, Marilu Nunez MD MPH    lactulose 20 gram/30 mL oral solution 20 g, 20 g, oral, Daily, Grady Rodriguez MD, 20 g at 11/29/23 0837    melatonin tablet 5 mg, 5 mg, oral, q PM, Marilu Nunez MD MPH    midodrine (Proamatine) tablet 5 mg, 5 mg, oral, TID with meals, Adriano Braun MD    multivitamin with minerals 1 tablet, 1 tablet, oral, Daily, Grady Rodriguez MD, 1 tablet at 11/29/23 0837    nicotine (Nicoderm CQ) 14 mg/24 hr patch 1 patch, 1 patch, transdermal, Daily, Grady Rodriguez MD, 1 patch at 11/29/23 0840    ondansetron (Zofran) tablet 4 mg, 4 mg, oral, q8h PRN, Ana Ennis MD, 4 mg at 11/27/23 1228    pantoprazole (ProtoNix) EC tablet 40 mg, 40 mg, oral, Daily before breakfast, Grady Rodriguez MD, 40 mg at 11/29/23 0837    risperiDONE (RisperDAL) tablet 1 mg, 1 mg, oral, BID, Grady Rodriguez MD, 1 mg at 11/29/23 0837    sulfur hexafluoride microsphr (Lumason) injection 24.28 mg, 2 mL, intravenous, Once in imaging, Grady Rodriguez MD    thiamine (Vitamin B-1) tablet 100 mg, 100 mg, oral, Daily, Ana Ennis MD, 100 mg at 11/29/23 0838    traZODone (Desyrel) tablet 50 mg, 50 mg, oral, Nightly, Marilu Nunez MD MPH      Objective   Physical Exam:  General: alert and in no apparent distress. Speaks coherently and without difficulty.   HEENT: normocephalic, atraumatic, EOMI, no scleral icterus  CV: RRR, no murmurs or gallops. Pedal pulse slightly diminished in right foot compared to left  Pulm: no  "wheezes or crackles, work of breathing is appropriate  Abd: firm, less distended than the previous day (post para), mild tenderness to palpation diffusely. Paracentesis insertion site looks clean and non erythematous.  : no jackson  Skin: no rashes, no spider angiomas seen   Neuro: moving all extremities. RLE and LLE extremities sensation intact to light touch and palpation both distally and proximally  MSK: The right foot was atraumatic, with full range of motion, calor and full sensation. The DVT wraps looked to be improperly placed with the swelling starting where the wrap ends. Left foot displays no signs of swelling or inflammation  Psych: normal affect and cognition    Last Recorded Vitals  Blood pressure 96/63, pulse 97, temperature 36.9 °C (98.4 °F), temperature source Temporal, resp. rate 20, height 1.753 m (5' 9\"), weight 49.9 kg (110 lb), SpO2 97 %.  Intake/Output last 3 Shifts:  I/O last 3 completed shifts:  In: 290 (5.8 mL/kg) [P.O.:240; IV Piggyback:50]  Out: 1200 (24.1 mL/kg) [Urine:1200 (0.7 mL/kg/hr)]  Weight: 49.9 kg         Relevant Results       Lab Results   Component Value Date    WBC 7.0 11/29/2023    HGB 8.4 (L) 11/29/2023    HCT 24.3 (L) 11/29/2023     (H) 11/29/2023     11/29/2023     Lab Results   Component Value Date    GLUCOSE 85 11/29/2023    CALCIUM 7.6 (L) 11/29/2023     11/29/2023    K 4.0 11/29/2023    CO2 28 11/29/2023     11/29/2023    BUN 4 (L) 11/29/2023    CREATININE 0.80 11/29/2023     Lab Results   Component Value Date    ALKPHOS 80 11/29/2023    ALT 11 11/29/2023    AST 29 11/29/2023    PROTIME 16.8 (H) 11/29/2023    INR 1.5 (H) 11/29/2023     MELD 3.0: 13 at 11/29/2023  7:05 AM  MELD-Na: 11 at 11/29/2023  7:05 AM  Calculated from:  Serum Creatinine: 0.80 mg/dL (Using min of 1 mg/dL) at 11/29/2023  7:05 AM  Serum Sodium: 136 mmol/L at 11/29/2023  7:05 AM  Total Bilirubin: 0.6 mg/dL (Using min of 1 mg/dL) at 11/29/2023  7:05 AM  Serum Albumin: 2.0 " g/dL at 11/29/2023  7:05 AM  INR(ratio): 1.5 at 11/29/2023  7:05 AM  Age at listing (hypothetical): 62 years  Sex: Male at 11/29/2023  7:05 AM        Paracentesis Resuts:   Latest Reference Range & Units 11/28/23 11:19   Neutrophils %, Manual, Fluid <25 % % 6   Lymphocytes %, Manual, Fluid <75 % % 8   Mono/Macrophages %, Manual, Fluid <70 % % 86   Eosinophils %, Manual, Fluid 0 % % 0   Basophils %, Manual, Fluid 0 % % 0   Albumin, Fluid Not established g/dL <0.5   Color, Fluid Colorless, Straw, Yellow  Straw   RBC, Fluid 0  /uL /uL 0   Protein, Total Fluid Not established g/dL 0.5   Total Cells Counted, Fluid  100   Clarity, Fluid Clear  Hazy !   WBC, Fluid See Comment /uL 45   Immature Granulocytes %, Manual, Fluid 0 % % 0   Blasts %, Manual, Fluid 0 % % 0   Unclassified Cells %, Manual, Fluid 0 % % 0   Plasma Cells %, Manual, Fluid 0 % % 0   !: Data is abnormal     Contains abnormal data Immunoglobulin free LT chains blood    Status: Final result        Component  Ref Range & Units 11/24/23 2127   Ig [place] Free Light Chain  0.33 - 1.94 mg/dL 7.53 High    Ig Lambda Free Light Chain  0.57 - 2.63 mg/dL 4.69 High    Kappa/Lambda Ratio  0.26 - 1.65 1.61   Resulting Agency [facility]   Narrative    Undetected antigen excess is a rare event but cannot be  excluded. If these free light chain results do not agree  with other clinical or laboratory findings, or if the  sample is from a patient that has previously demonstrated  antigen excess, the result must be checked by retesting  at a higher sample dilution. Results should always be  interpreted in conjunction with other laboratory tests  and clinical evidence; any anomalies should be discussed  with the testing laboratory.        Specimen Collected: 11/24/23 2127 Last Resulted: 11/28/23 1536 View Other Order Details            Contains abnormal data ELA with Reflex to LIZET    Status: Final result       Dx: Chronic liver disease        Component  Ref Range & Units 11/24/23  2127   ELA  Negative Positive Abnormal    Comment: The Antinuclear Antibody (ELA) test was performed using  indirect immunofluorescence assay with HEp-2 cells slide.   ELA Pattern Speckled   ELA Titer 1:160   Resulting Agency [facility]        Specimen Collected: 11/24/23 2127 Last Resulted: 11/29/23 0841 View Other Order Details            Contains abnormal data Lactoferrin, fecal, quantitative  Order: [id number]  Status: Final result       Visible to patient: No (inaccessible in [facility])    0 Result Notes      Component  Ref Range & Units 4 d ago   lactoferrin, Fecal, Quant.  Negative Positive Abnormal    Comment: A positive result is indicative of the presence of lactoferrin, a  marker for fecal leukocytes.  Performed By: [facility]  [address] [place] [address]  : Max Rivera MD, PhD  CLIA Number: [id number]   Resulting Agency [facility]           Calprotectin, Stool  Calprotectin Stool  Collected: 11/25/23 1113   Result status: Final   Resulting lab[facility]   Reference range: <=49 ug/g   Value: 230 High    Comment: REFERENCE INTERVAL: Calprotectin, Fecal by Immunoassay      Less than 50 ug/g.........Normal     ug/g...............Borderline elevated, test should be                              re-evaluated in 4-6 weeks.    121 ug/g or greater.......Elevated  Performed By: [facility]  [address] [place] [address]  : Max Rivera MD, PhD  CLIA Number: [id number]   *Additional information available - comment          Assessment/Plan   Principal Problem:    Acute pulmonary embolism without acute cor pulmonale, unspecified pulmonary embolism type (CMS/HCC)    Junito Rodríguez is a 62 y.o. male PMHx of schizophrenia, depression, anxiety, COPD (PFTs not on file), alcohol use disorder, significant tobacco use, and chronic lower back pain.  Patient presented to the ED with 7-day history of leg swelling, abdominal  swelling, shortness of breath, and anorexia. Diagnosed with decompensated cirrhosis likely due to alcohol use and bilateral PEs without evidence of right heart strain or hypoxia, extensive R lower extremity DVTs, and IVC thrombus likely provoked in the setting of decreased mobility. Started heparin 11/24, which was switched to Apixaban post paracentesis (11/28). Echo performed on 11/25 showed hyperdynamic EF and no evidence of RV strain. Patient placed on ceftriaxone prophylaxis 11/24 for SBP. Paracentesis (11/28) showed SAAG of 1.5 and total protein of 0.5, which is suggestive of sinusoidal etiology; paired with past alcohol use history, alcoholic etiology for the decompensated ascites is most likely. Low absolute neutrophil count and lack of culture growth was not suggestive of SBP, thus, ceftriaxone was stopped 11/28. Lactulose was given for hepatic encephalopathy prophylaxis and switched from titration to bowel movement to daily 11/28.  Furosamide 20 mg started 11/29 to target excess fluid.       Today:  -Monitor right leg swelling and orthostatic vitals  -Furosamide 20 mg started  -Switch Melatonin 3 mg daily to Melatonin 5 mg and Trazodone 50 mg   -Gabapentin 300mg  -PT/OT  -ECG    #Bilateral PE's  #Dyspnea on exertion  :: Evident on CT PE  ::Multiple bilateral pulmonary emboli, right-greater-than-left with  the largest embolism located in the right middle lobar artery.  Additional involvement of the right upper and lower lobes and  segmental/subsegmental involvement in the left lower lobe.  -DVT ultrasound 11/25 with R prox fem vein through popliteal, posterior tibial, and peroneal thrombus. No evidence of clot in the left leg   :: Trop:30 Lactate:2.4  BNP: 54  -Switched from IV Heparin to Apixaban 11/28 for DVT prophylaxis   -Monitor right leg swelling   -Echo 11/25 with hyperdynamic EF and no evidence of RV strain   - Kappa:Lambda ratio of 1.61 makes multiple myeloma less likely    #Massive  ascites  #Decompensated cirrhosis likely alcohol related   :: Meld 3.0 12  -US doppler of liver with cirrhotic morphology, no evidence of thrombus in the hepatic vasculature   -CTX 1 g daily for SBP prophylaxis discontinued  given low neutrophil counts  - Continue Lactulose q day  -Pt counseled on alcohol cessation and reports understanding   [] IR para performed  suggestive of sinusoidal etiology   [] Furosamide 20 mg started  to target excess fluid   [] Scheduled lactulose, not titrating to BM on      #Possible enterocolitis- resolved  #Weight loss, change in appetite  -no further diarrhea  -continuing empiric abx as above   -stool pathogen PCR negative  -Lactoferrin and calprotectin from  showed positive findings. Given use of ceftriaxone and absence of systemic symptoms and non-revealing abdominal exams, current infection is unlikely    #COPD  -Continue home albuterol and Breo Ellipta. Follow up on administration    #Depression  #Anxiety  -Continue home risperidone 1 mg twice daily     #Alcohol abuse  -Folate supplementation and multivitamin  -Folate and B12 level   -Thiamine switched to PO after current IV regimen    -CIWA discontinued  following 4 days of 0 CIWA scores      #Chronic Tobacco use  - Nicotine patch      #Insomnia   -Switch Melatonin 3 mg daily to Melatonin 5 mg and Trazodone 50 mg given lack of efficacy of Melatonin 3mg on     #Tachycardia  -ECG     F: PRN  E: k>4 Mg>2  N: low sodium diet, 1.6L fluid restriction   GI ppx: PPI  DVT ppx: heparin gtt, transition to apixaban following para    Dispo: pending PT/OT eval     Full code  Next of kin: Daughter (Karlee) : [phone]       WILLIAMS Vang GABO MS3

## 2023-11-30 ENCOUNTER — APPOINTMENT (OUTPATIENT)
Dept: CARDIOLOGY | Facility: HOSPITAL | Age: 63
DRG: 175 | End: 2023-11-30
Payer: MEDICARE

## 2023-11-30 ENCOUNTER — PHARMACY VISIT (OUTPATIENT)
Dept: PHARMACY | Facility: CLINIC | Age: 63
End: 2023-11-30
Payer: COMMERCIAL

## 2023-11-30 LAB
ALBUMIN SERPL BCP-MCNC: 2 G/DL (ref 3.4–5)
ALBUMIN: 1.9 G/DL (ref 3.4–5)
ALP SERPL-CCNC: 89 U/L (ref 33–136)
ALPHA 1 GLOBULIN: 0.4 G/DL (ref 0.2–0.6)
ALPHA 2 GLOBULIN: 0.5 G/DL (ref 0.4–1.1)
ALT SERPL W P-5'-P-CCNC: 13 U/L (ref 10–52)
ANION GAP SERPL CALC-SCNC: 10 MMOL/L (ref 10–20)
AST SERPL W P-5'-P-CCNC: 25 U/L (ref 9–39)
ATRIAL RATE: 95 BPM
BASOPHILS # BLD AUTO: 0.03 X10*3/UL (ref 0–0.1)
BASOPHILS NFR BLD AUTO: 0.4 %
BETA GLOBULIN: 0.9 G/DL (ref 0.5–1.2)
BILIRUB DIRECT SERPL-MCNC: 0.2 MG/DL (ref 0–0.3)
BILIRUB SERPL-MCNC: 0.5 MG/DL (ref 0–1.2)
BUN SERPL-MCNC: 6 MG/DL (ref 6–23)
CALCIUM SERPL-MCNC: 7.7 MG/DL (ref 8.6–10.6)
CHLORIDE SERPL-SCNC: 100 MMOL/L (ref 98–107)
CO2 SERPL-SCNC: 29 MMOL/L (ref 21–32)
CREAT SERPL-MCNC: 0.83 MG/DL (ref 0.5–1.3)
EOSINOPHIL # BLD AUTO: 0.02 X10*3/UL (ref 0–0.7)
EOSINOPHIL NFR BLD AUTO: 0.3 %
ERYTHROCYTE [DISTWIDTH] IN BLOOD BY AUTOMATED COUNT: 18.2 % (ref 11.5–14.5)
GAMMA GLOBULIN: 1.2 G/DL (ref 0.5–1.4)
GFR SERPL CREATININE-BSD FRML MDRD: >90 ML/MIN/1.73M*2
GLUCOSE SERPL-MCNC: 98 MG/DL (ref 74–99)
HCT VFR BLD AUTO: 27 % (ref 41–52)
HGB BLD-MCNC: 9.2 G/DL (ref 13.5–17.5)
IMM GRANULOCYTES # BLD AUTO: 0.03 X10*3/UL (ref 0–0.7)
IMM GRANULOCYTES NFR BLD AUTO: 0.4 % (ref 0–0.9)
IMMUNOFIXATION COMMENT: ABNORMAL
INR PPP: 2.2 (ref 0.9–1.1)
LYMPHOCYTES # BLD AUTO: 2.56 X10*3/UL (ref 1.2–4.8)
LYMPHOCYTES NFR BLD AUTO: 37 %
M-PROTEIN 1: 0.2 G/DL
MAGNESIUM SERPL-MCNC: 1.63 MG/DL (ref 1.6–2.4)
MCH RBC QN AUTO: 35.5 PG (ref 26–34)
MCHC RBC AUTO-ENTMCNC: 34.1 G/DL (ref 32–36)
MCV RBC AUTO: 104 FL (ref 80–100)
MONOCYTES # BLD AUTO: 0.61 X10*3/UL (ref 0.1–1)
MONOCYTES NFR BLD AUTO: 8.8 %
NEUTROPHILS # BLD AUTO: 3.67 X10*3/UL (ref 1.2–7.7)
NEUTROPHILS NFR BLD AUTO: 53.1 %
NRBC BLD-RTO: 0 /100 WBCS (ref 0–0)
P AXIS: 64 DEGREES
P OFFSET: 199 MS
P ONSET: 157 MS
PATH REVIEW - SERUM IMMUNOFIXATION: ABNORMAL
PATH REVIEW-SERUM PROTEIN ELECTROPHORESIS: ABNORMAL
PLATELET # BLD AUTO: 232 X10*3/UL (ref 150–450)
POTASSIUM SERPL-SCNC: 4.1 MMOL/L (ref 3.5–5.3)
PR INTERVAL: 136 MS
PROT SERPL-MCNC: 4.7 G/DL (ref 6.4–8.2)
PROTEIN ELECTROPHORESIS COMMENT: ABNORMAL
PROTHROMBIN TIME: 24.7 SECONDS (ref 9.8–12.8)
Q ONSET: 225 MS
QRS COUNT: 15 BEATS
QRS DURATION: 62 MS
QT INTERVAL: 330 MS
QTC CALCULATION(BAZETT): 414 MS
QTC FREDERICIA: 384 MS
R AXIS: 18 DEGREES
RBC # BLD AUTO: 2.59 X10*6/UL (ref 4.5–5.9)
SODIUM SERPL-SCNC: 135 MMOL/L (ref 136–145)
T AXIS: 41 DEGREES
T OFFSET: 390 MS
VENTRICULAR RATE: 95 BPM
WBC # BLD AUTO: 6.9 X10*3/UL (ref 4.4–11.3)

## 2023-11-30 PROCEDURE — 97165 OT EVAL LOW COMPLEX 30 MIN: CPT | Mod: GO

## 2023-11-30 PROCEDURE — 85610 PROTHROMBIN TIME: CPT

## 2023-11-30 PROCEDURE — 2500000001 HC RX 250 WO HCPCS SELF ADMINISTERED DRUGS (ALT 637 FOR MEDICARE OP): Performed by: STUDENT IN AN ORGANIZED HEALTH CARE EDUCATION/TRAINING PROGRAM

## 2023-11-30 PROCEDURE — 2500000002 HC RX 250 W HCPCS SELF ADMINISTERED DRUGS (ALT 637 FOR MEDICARE OP, ALT 636 FOR OP/ED)

## 2023-11-30 PROCEDURE — 2500000004 HC RX 250 GENERAL PHARMACY W/ HCPCS (ALT 636 FOR OP/ED): Performed by: RADIOLOGY

## 2023-11-30 PROCEDURE — 1100000001 HC PRIVATE ROOM DAILY

## 2023-11-30 PROCEDURE — 2500000004 HC RX 250 GENERAL PHARMACY W/ HCPCS (ALT 636 FOR OP/ED)

## 2023-11-30 PROCEDURE — 94640 AIRWAY INHALATION TREATMENT: CPT

## 2023-11-30 PROCEDURE — 82248 BILIRUBIN DIRECT: CPT

## 2023-11-30 PROCEDURE — S4991 NICOTINE PATCH NONLEGEND: HCPCS

## 2023-11-30 PROCEDURE — 85025 COMPLETE CBC W/AUTO DIFF WBC: CPT

## 2023-11-30 PROCEDURE — 80048 BASIC METABOLIC PNL TOTAL CA: CPT

## 2023-11-30 PROCEDURE — 2500000001 HC RX 250 WO HCPCS SELF ADMINISTERED DRUGS (ALT 637 FOR MEDICARE OP)

## 2023-11-30 PROCEDURE — 93005 ELECTROCARDIOGRAM TRACING: CPT

## 2023-11-30 PROCEDURE — 2500000001 HC RX 250 WO HCPCS SELF ADMINISTERED DRUGS (ALT 637 FOR MEDICARE OP): Performed by: INTERNAL MEDICINE

## 2023-11-30 PROCEDURE — RXMED WILLOW AMBULATORY MEDICATION CHARGE

## 2023-11-30 PROCEDURE — 36415 COLL VENOUS BLD VENIPUNCTURE: CPT

## 2023-11-30 PROCEDURE — 99233 SBSQ HOSP IP/OBS HIGH 50: CPT | Performed by: INTERNAL MEDICINE

## 2023-11-30 PROCEDURE — 83735 ASSAY OF MAGNESIUM: CPT

## 2023-11-30 RX ORDER — MAGNESIUM SULFATE HEPTAHYDRATE 40 MG/ML
2 INJECTION, SOLUTION INTRAVENOUS ONCE
Status: DISCONTINUED | OUTPATIENT
Start: 2023-11-30 | End: 2023-11-30

## 2023-11-30 RX ORDER — SPIRONOLACTONE 25 MG/1
50 TABLET ORAL DAILY
Status: DISCONTINUED | OUTPATIENT
Start: 2023-11-30 | End: 2023-11-30

## 2023-11-30 RX ORDER — FLUTICASONE FUROATE AND VILANTEROL 200; 25 UG/1; UG/1
1 POWDER RESPIRATORY (INHALATION)
Qty: 30 EACH | Refills: 1 | Status: SHIPPED | OUTPATIENT
Start: 2023-12-01 | End: 2024-01-30

## 2023-11-30 RX ORDER — SPIRONOLACTONE 50 MG/1
50 TABLET, FILM COATED ORAL DAILY
Qty: 30 TABLET | Refills: 1 | Status: SHIPPED | OUTPATIENT
Start: 2023-12-01 | End: 2024-01-30

## 2023-11-30 RX ORDER — LANOLIN ALCOHOL/MO/W.PET/CERES
100 CREAM (GRAM) TOPICAL DAILY
Qty: 30 TABLET | Refills: 1 | Status: SHIPPED | OUTPATIENT
Start: 2023-12-01 | End: 2024-01-30

## 2023-11-30 RX ORDER — RISPERIDONE 1 MG/1
1 TABLET ORAL 2 TIMES DAILY
Qty: 60 TABLET | Refills: 1 | Status: SHIPPED | OUTPATIENT
Start: 2023-11-30 | End: 2024-01-29

## 2023-11-30 RX ORDER — GABAPENTIN 300 MG/1
300 CAPSULE ORAL NIGHTLY
Qty: 30 CAPSULE | Refills: 1 | OUTPATIENT
Start: 2023-11-30 | End: 2024-03-25

## 2023-11-30 RX ORDER — FOLIC ACID 1 MG/1
1 TABLET ORAL DAILY
Qty: 30 TABLET | Refills: 1 | Status: SHIPPED | OUTPATIENT
Start: 2023-12-01 | End: 2024-01-30

## 2023-11-30 RX ORDER — LANOLIN ALCOHOL/MO/W.PET/CERES
800 CREAM (GRAM) TOPICAL DAILY
Status: DISCONTINUED | OUTPATIENT
Start: 2023-11-30 | End: 2023-12-01 | Stop reason: HOSPADM

## 2023-11-30 RX ORDER — ACETAMINOPHEN 500 MG
5 TABLET ORAL EVERY EVENING
Qty: 30 TABLET | Refills: 1 | Status: SHIPPED | OUTPATIENT
Start: 2023-11-30 | End: 2024-01-29

## 2023-11-30 RX ORDER — MIDODRINE HYDROCHLORIDE 5 MG/1
5 TABLET ORAL
Qty: 90 TABLET | Refills: 1 | Status: SHIPPED | OUTPATIENT
Start: 2023-11-30 | End: 2024-01-29

## 2023-11-30 RX ORDER — TRAZODONE HYDROCHLORIDE 50 MG/1
50 TABLET ORAL NIGHTLY
Qty: 30 TABLET | Refills: 1 | Status: SHIPPED | OUTPATIENT
Start: 2023-11-30 | End: 2024-01-29

## 2023-11-30 RX ORDER — MULTIVIT-MIN/IRON FUM/FOLIC AC 7.5 MG-4
1 TABLET ORAL DAILY
Qty: 30 TABLET | Refills: 1 | Status: SHIPPED | OUTPATIENT
Start: 2023-12-01 | End: 2024-01-30

## 2023-11-30 RX ORDER — SPIRONOLACTONE 25 MG/1
50 TABLET ORAL DAILY
Status: DISCONTINUED | OUTPATIENT
Start: 2023-11-30 | End: 2023-12-01 | Stop reason: HOSPADM

## 2023-11-30 RX ORDER — FUROSEMIDE 20 MG/1
20 TABLET ORAL DAILY
Qty: 30 TABLET | Refills: 1 | Status: SHIPPED | OUTPATIENT
Start: 2023-12-01 | End: 2024-01-30

## 2023-11-30 RX ORDER — IBUPROFEN 200 MG
1 TABLET ORAL DAILY
Qty: 30 PATCH | Refills: 1 | Status: SHIPPED | OUTPATIENT
Start: 2023-12-01 | End: 2024-01-30

## 2023-11-30 RX ORDER — LACTULOSE 10 G/15ML
20 SOLUTION ORAL DAILY
Qty: 900 ML | Refills: 1 | Status: SHIPPED | OUTPATIENT
Start: 2023-12-01 | End: 2024-01-30

## 2023-11-30 RX ORDER — PANTOPRAZOLE SODIUM 40 MG/1
40 TABLET, DELAYED RELEASE ORAL
Qty: 30 TABLET | Refills: 1 | Status: SHIPPED | OUTPATIENT
Start: 2023-12-01 | End: 2024-01-30

## 2023-11-30 RX ADMIN — MIDODRINE HYDROCHLORIDE 5 MG: 5 TABLET ORAL at 09:05

## 2023-11-30 RX ADMIN — Medication 800 MG: at 18:34

## 2023-11-30 RX ADMIN — MIDODRINE HYDROCHLORIDE 5 MG: 5 TABLET ORAL at 12:00

## 2023-11-30 RX ADMIN — PANTOPRAZOLE SODIUM 40 MG: 40 TABLET, DELAYED RELEASE ORAL at 05:16

## 2023-11-30 RX ADMIN — TRAZODONE HYDROCHLORIDE 50 MG: 50 TABLET ORAL at 20:34

## 2023-11-30 RX ADMIN — GABAPENTIN 300 MG: 300 CAPSULE ORAL at 20:34

## 2023-11-30 RX ADMIN — Medication 5 MG: at 20:34

## 2023-11-30 RX ADMIN — SPIRONOLACTONE 50 MG: 25 TABLET, FILM COATED ORAL at 11:41

## 2023-11-30 RX ADMIN — APIXABAN 10 MG: 5 TABLET, FILM COATED ORAL at 20:34

## 2023-11-30 RX ADMIN — FLUTICASONE FUROATE AND VILANTEROL TRIFENATATE 1 PUFF: 200; 25 POWDER RESPIRATORY (INHALATION) at 08:56

## 2023-11-30 RX ADMIN — THIAMINE HCL TAB 100 MG 100 MG: 100 TAB at 09:05

## 2023-11-30 RX ADMIN — RISPERIDONE 1 MG: 1 TABLET ORAL at 09:05

## 2023-11-30 RX ADMIN — Medication 1 PATCH: at 09:12

## 2023-11-30 RX ADMIN — MIDODRINE HYDROCHLORIDE 5 MG: 5 TABLET ORAL at 20:36

## 2023-11-30 RX ADMIN — RISPERIDONE 1 MG: 1 TABLET ORAL at 20:34

## 2023-11-30 RX ADMIN — Medication 1 TABLET: at 09:05

## 2023-11-30 RX ADMIN — FUROSEMIDE 20 MG: 20 TABLET ORAL at 09:04

## 2023-11-30 RX ADMIN — LACTULOSE 20 G: 20 SOLUTION ORAL at 09:04

## 2023-11-30 RX ADMIN — APIXABAN 10 MG: 5 TABLET, FILM COATED ORAL at 09:04

## 2023-11-30 RX ADMIN — FOLIC ACID 1 MG: 1 TABLET ORAL at 09:04

## 2023-11-30 ASSESSMENT — COGNITIVE AND FUNCTIONAL STATUS - GENERAL
DAILY ACTIVITIY SCORE: 24
MOBILITY SCORE: 24
DAILY ACTIVITIY SCORE: 24

## 2023-11-30 ASSESSMENT — PAIN - FUNCTIONAL ASSESSMENT: PAIN_FUNCTIONAL_ASSESSMENT: 0-10

## 2023-11-30 ASSESSMENT — PAIN SCALES - GENERAL
PAINLEVEL_OUTOF10: 0 - NO PAIN
PAINLEVEL_OUTOF10: 0 - NO PAIN

## 2023-11-30 ASSESSMENT — ACTIVITIES OF DAILY LIVING (ADL): BATHING_ASSISTANCE: MODIFIED INDEPENDENT (DEVICE)

## 2023-11-30 NOTE — PROGRESS NOTES
"Junito Rodríguez is a 62 y.o. male on day 6 of admission presenting with Acute pulmonary embolism without acute cor pulmonale, unspecified pulmonary embolism type (CMS/HCC).      Subjective   Junito Rodríguez states that the right ankle swelling and pain present yesterday continues but is improved after changing the DVT prophylaxis wrapping  to encompass part of the foot and upper RLE. He states that the pain, particularly at the upper RLE, is greatly improved. He relates that he was able to walk the length of the hallway twice, as he has done the past few days, without cardiovascular difficulty. He endorsed some foot discomfort during ambulation. He denied difficulty breathing, frequent wheezing, hemoptysis, sputum or any oral secretions. He denied palpitations, chest pain, orthopnea, or difficulty breathing upon ambulation. He denied fever, chills or generalized fatigue. His heart rate is mildly decreased from the previous day, hovering in the high 90s range.     Mr. Rodríguez had a paracentesis procedure 2 days ago and describes diminished abdominal pain and discomfort. He denies any pain or discomfort around the insertion site. He denied any changes in the quality or coloration of the stool. He denied diffuse abdominal pain, urinary changes or stool frequency changes. He related he had the nurse assess one of this stools and the nurse did not see signs of concern. We spoke briefly about the importance of the low sodium diet and water restriction in preventing fluid overload.     Mr. Rodríguez relates that he slept \"really well\" after the change in regimen to 5 mg Melatonin and 50 mg Trazadone. He stated that he slept from 9 pm to 5:30 am with one interruption at 1 am. He stated that he feels refreshed this morning.        Current Facility-Administered Medications:     acetaminophen (Tylenol) tablet 650 mg, 650 mg, oral, q4h PRN, Grady Rodriguez MD, 650 mg at 11/29/23 0839    albuterol 2.5 mg /3 mL (0.083 %) nebulizer solution 2.5 " mg, 2.5 mg, nebulization, q6h PRN, Grady Rodriguez MD    apixaban (Eliquis) tablet 10 mg, 10 mg, oral, BID, 10 mg at 11/29/23 2027 **FOLLOWED BY** [START ON 12/5/2023] apixaban (Eliquis) tablet 5 mg, 5 mg, oral, BID, Adriano Braun MD    diclofenac sodium (Voltaren) 1 % gel 1 Application, 4 g, Topical, 4x daily PRN, Sesar Rowland MD PhD, 1 Application at 11/26/23 1801    fluticasone furoate-vilanteroL (Breo Ellipta) 200-25 mcg/dose inhaler 1 puff, 1 puff, inhalation, Daily, Grady Rodriguez MD, 1 puff at 11/29/23 0828    folic acid (Folvite) tablet 1 mg, 1 mg, oral, Daily, Grady Rodriguez MD, 1 mg at 11/29/23 0838    furosemide (Lasix) tablet 20 mg, 20 mg, oral, Daily, Adriano Braun MD, 20 mg at 11/29/23 1137    gabapentin (Neurontin) capsule 300 mg, 300 mg, oral, Nightly, Marilu Nunez MD MPH, 300 mg at 11/29/23 2027    lactulose 20 gram/30 mL oral solution 20 g, 20 g, oral, Daily, Grady Rodriguez MD, 20 g at 11/29/23 0837    melatonin tablet 5 mg, 5 mg, oral, q PM, Marilu Nunez MD MPH, 5 mg at 11/29/23 2027    midodrine (Proamatine) tablet 5 mg, 5 mg, oral, TID with meals, Adriano Braun MD, 5 mg at 11/29/23 1706    multivitamin with minerals 1 tablet, 1 tablet, oral, Daily, Grady Rodriguez MD, 1 tablet at 11/29/23 0837    nicotine (Nicoderm CQ) 14 mg/24 hr patch 1 patch, 1 patch, transdermal, Daily, Grady Rodriguez MD, 1 patch at 11/29/23 0840    ondansetron (Zofran) tablet 4 mg, 4 mg, oral, q8h PRN, Ana Ennis MD, 4 mg at 11/27/23 1228    pantoprazole (ProtoNix) EC tablet 40 mg, 40 mg, oral, Daily before breakfast, rGady Rodriguez MD, 40 mg at 11/30/23 0516    risperiDONE (RisperDAL) tablet 1 mg, 1 mg, oral, BID, Grady Rodriguez MD, 1 mg at 11/29/23 2028    sulfur hexafluoride microsphr (Lumason) injection 24.28 mg, 2 mL, intravenous, Once in imaging, Grady Rodriguez MD    thiamine (Vitamin B-1) tablet 100 mg, 100 mg, oral, Daily, Ana Ennis MD, 100 mg at 11/29/23 0838    traZODone (Desyrel)  "tablet 50 mg, 50 mg, oral, Nightly, Marilu Nunez MD MPH, 50 mg at 11/29/23 2027      Objective   Physical Exam:  General: Alert and in no apparent distress. Speaks coherently and without difficulty. States that he feels particularly good this morning.   HEENT: normocephalic, atraumatic, EOMI, no scleral icterus  CV: RRR, no murmurs or gallops. Pedal pulse slightly diminished in right foot compared to left.  Pulm: No wheezes or crackles, work of breathing is appropriate  Abd: firm, less distended than the previous day (post para), mild tenderness to palpation diffusely. Paracentesis insertion site looks clean and non erythematous.  : no jackson  Skin: no rashes, no spider angiomas seen   Neuro: moving all extremities. RLE and LLE extremities sensation intact to light touch and palpation both distally and proximally  MSK: The right foot was atraumatic, with full range of motion, calor and full sensation. The DVT wraps looks in proper position, covering the length of the foot and upper RLE. Diminished pain upon palpation compared to the previous day. Intact sensation to fine touch. Left foot displays no signs of swelling or inflammation  Psych: normal affect and cognition    Last Recorded Vitals  Blood pressure 101/70, pulse 96, temperature 36.6 °C (97.9 °F), temperature source Temporal, resp. rate 16, height 1.753 m (5' 9\"), weight 49.9 kg (110 lb), SpO2 99 %.  Intake/Output last 3 Shifts:  I/O last 3 completed shifts:  In: 1010 (20.2 mL/kg) [P.O.:1010]  Out: 200 (4 mL/kg) [Urine:200 (0.1 mL/kg/hr)]  Weight: 49.9 kg         Relevant Results       Lab Results   Component Value Date    WBC 7.0 11/29/2023    HGB 8.4 (L) 11/29/2023    HCT 24.3 (L) 11/29/2023     (H) 11/29/2023     11/29/2023     Lab Results   Component Value Date    GLUCOSE 85 11/29/2023    CALCIUM 7.6 (L) 11/29/2023     11/29/2023    K 4.0 11/29/2023    CO2 28 11/29/2023     11/29/2023    BUN 4 (L) 11/29/2023    " CREATININE 0.80 11/29/2023     Lab Results   Component Value Date    ALKPHOS 80 11/29/2023    ALT 11 11/29/2023    AST 29 11/29/2023    PROTIME 16.8 (H) 11/29/2023    INR 1.5 (H) 11/29/2023     MELD 3.0: 13 at 11/29/2023  7:05 AM  MELD-Na: 11 at 11/29/2023  7:05 AM  Calculated from:  Serum Creatinine: 0.80 mg/dL (Using min of 1 mg/dL) at 11/29/2023  7:05 AM  Serum Sodium: 136 mmol/L at 11/29/2023  7:05 AM  Total Bilirubin: 0.6 mg/dL (Using min of 1 mg/dL) at 11/29/2023  7:05 AM  Serum Albumin: 2.0 g/dL at 11/29/2023  7:05 AM  INR(ratio): 1.5 at 11/29/2023  7:05 AM  Age at listing (hypothetical): 62 years  Sex: Male at 11/29/2023  7:05 AM      Contains abnormal data LIZET Panel  Order: [id number] - Reflex for Order [id number]  Status: Final result       Visible to patient: No (inaccessible in [facility])       Dx: Chronic liver disease    0 Result Notes      Component  Ref Range & Units 6 d ago   Anti-SM  <1.0 AI <0.2   Comment: < 1.0 = NEGATIVE  >=1.0 = POSITIVE   Anti-RNP  <1.0 AI 1.1 High    Comment: < 1.0 = NEGATIVE  >=1.0 = POSITIVE   Anti-SM/RNP  <1.0 AI <0.2   Comment: < 1.0 = NEGATIVE  >=1.0 = POSITIVE   Anti-SSA  <1.0 AI 0.2   Comment: < 1.0 = NEGATIVE  >=1.0 = POSITIVE   Anti-SSB  <1.0 AI <0.2   Comment: < 1.0 = NEGATIVE  >=1.0 = POSITIVE   Anti-SCL-70  <1.0 AI <0.2   Comment: < 1.0 = NEGATIVE  >=1.0 = POSITIVE   Anti-SAMANTHA-1 IgG  <1.0 AI <0.2   Comment: < 1.0 = NEGATIVE  >=1.0 = POSITIVE   Anti-Chromatin  <1.0 AI <0.2   Comment: < 1.0 = NEGATIVE  >=1.0 = POSITIVE   Anti-Centromere  <1.0 AI <0.2   Comment: < 1.0 = NEGATIVE  >=1.0 = POSITIVE   ANTI-RIBOSOMAL P  <1.0 AI <0.2   Comment: < 1.0 = NEGATIVE  >=1.0 = POSITIVE   Anti-DNA (DS)  <5.0 IU/mL 2.0   Comment: NEGATIVE: <= 4 IU/ML  EQUIVOCAL: 5- 9 IU/ML  POSITIVE: >=10 IU/ML   Resulting Agency [facility]              Component  Ref Range & Units 6 d ago   ELA  Negative Positive Abnormal    Comment: The Antinuclear Antibody (ELA) test was performed using  indirect  immunofluorescence assay with HEp-2 cells slide.   ELA Pattern Speckled   ELA Titer 1:160   Resulting Agency [facility]            Contains abnormal data Drug Screen, Urine With Reflex to Confirmation  Order: [id number]  Status: Final result       Visible to patient: No (inaccessible in [facility])    0 Result Notes      Component  Ref Range & Units 5 d ago   Amphetamine Screen, Urine  Presumptive Negative Presumptive Negative   Comment: CUTOFF LEVEL: 500 NG/ML  Cross-reactivity has been reported with high concentrations  of the following drugs: buproprion, chloroquine, chlorpromazine,  ephedrine, mephentermine, fenfluramine, phentermine,  phenylpropanolamine, pseudoephedrine, and propranolol.   Barbiturate Screen, Urine  Presumptive Negative Presumptive Negative   Comment: CUTOFF LEVEL: 200 NG/ML   Benzodiazepines Screen, Urine  Presumptive Negative Presumptive Negative   Comment: CUTOFF LEVEL: 200 NG/ML   Cannabinoid Screen, Urine  Presumptive Negative Presumptive Positive Abnormal    Comment: CUTOFF LEVEL: 50 NG/ML   Cocaine Metabolite Screen, Urine  Presumptive Negative Presumptive Negative   Comment: CUTOFF LEVEL: 150 NG/ML   Fentanyl Screen, Urine  Presumptive Negative Presumptive Negative   Comment: CUTOFF LEVEL: 5 NG/ML   Opiate Screen, Urine  Presumptive Negative Presumptive Positive Abnormal    Comment: CUTOFF LEVEL: 300 NG/ML  The opiate screen does not detect fentanyl, meperidine, or  tramadol. Oxycodone is not consistently detected (refer to  Oxycodone Screen, Urine result).   Oxycodone Screen, Urine  Presumptive Negative Presumptive Positive Abnormal    Comment: CUTOFF LEVEL: 100 NG/ML  This test will accurately detect both oxycodone and oxymorphone.   PCP Screen, Urine  Presumptive Negative Presumptive Negative   Comment: CUTOFF LEVEL:  25 NG/ML  Cross-reactivity has been reported with dextromethorphan.   Resulting [facility]        Assessment/Plan   Principal Problem:    Acute pulmonary embolism without  acute cor pulmonale, unspecified pulmonary embolism type (CMS/HCC)    Junito Rodríguez is a 62 y.o. male PMHx of schizophrenia, depression, anxiety, COPD (PFTs not on file), alcohol use disorder, significant tobacco use, and chronic lower back pain.  Patient presented to the ED with 7-day history of leg swelling, abdominal swelling, shortness of breath, and anorexia. Diagnosed with decompensated cirrhosis likely due to alcohol use and bilateral PEs without evidence of right heart strain or hypoxia, extensive R lower extremity DVTs, and IVC thrombus likely provoked in the setting of decreased mobility. Started heparin 11/24, which was switched to Apixaban post paracentesis (11/28). Echo performed on 11/25 showed hyperdynamic EF and no evidence of RV strain. Patient placed on ceftriaxone prophylaxis 11/24 for SBP. Paracentesis (11/28) showed SAAG of 1.5 and total protein of 0.5, which is suggestive of sinusoidal etiology; paired with past alcohol use history, alcoholic etiology for the decompensated ascites is most likely. Low absolute neutrophil count and lack of culture growth was not suggestive of SBP, thus, ceftriaxone was stopped 11/28. Lactulose was given for hepatic encephalopathy prophylaxis and switched from titration to bowel movement to daily 11/28.  Furosamide 20 mg started 11/29 to target excess fluid. ECG 11/29 showed normal sinus rhythm. Spironolactone 50 mg started 11/30 for further diuresis.       Today:  -Monitor right leg swelling and orthostatic vitals  -start spironolactone 50 mg 11/30  -monitor response to diuretics     #Bilateral PE's  #Dyspnea on exertion  :: Evident on CT PE  ::Multiple bilateral pulmonary emboli, right-greater-than-left with  the largest embolism located in the right middle lobar artery.  Additional involvement of the right upper and lower lobes and  segmental/subsegmental involvement in the left lower lobe.  -DVT ultrasound 11/25 with R prox fem vein through popliteal,  posterior tibial, and peroneal thrombus. No evidence of clot in the left leg   :: Trop:30 Lactate:2.4  BNP: 54  -Switched from IV Heparin to Apixaban  for DVT prophylaxis   -Monitor right leg swelling   -Gabapentin 300 mg started   -Echo  with hyperdynamic EF and no evidence of RV strain   -Kappa:Lambda ratio of 1.61 makes multiple myeloma less likely    #Massive ascites  #Decompensated cirrhosis likely alcohol related   :: Meld 3.0 12  -US doppler of liver with cirrhotic morphology, no evidence of thrombus in the hepatic vasculature   -CTX 1 g daily for SBP prophylaxis discontinued  given low neutrophil counts  - Continue Lactulose q day  -Pt counseled on alcohol cessation and reports understanding   [] IR para performed  suggestive of sinusoidal etiology   [] Furosamide 20 mg started  to target excess fluid   [] Spironolactone 50 mg started  for further diuresis   [] Scheduled lactulose, not titrating to BM on   [] Midodrine 5 mg 3x daily to target soft blood pressure started      #Possible enterocolitis- resolved  #Weight loss, change in appetite  -no further diarrhea  -continuing empiric abx as above   -stool pathogen PCR negative  -Lactoferrin and calprotectin from  showed positive findings. Given use of ceftriaxone and absence of systemic symptoms and non-revealing abdominal exams, current infection is unlikely    #COPD  -Continue home albuterol and Breo Ellipta. Follow up on administration    #Depression  #Anxiety  -Continue home risperidone 1 mg twice daily     #Alcohol abuse  -Folate supplementation and multivitamin  -Folate and B12 level   -Thiamine switched to PO after current IV regimen    -CIWA discontinued  following 4 days of 0 CIWA scores      #Chronic Tobacco use  -Nicotine patch      #Insomnia   -Switched () Melatonin 3 mg daily to Melatonin 5 mg and Trazodone 50 mg given lack of efficacy of Melatonin 3mg on  11/28    #Tachycardia  -ECG 11/29 showed normal sinus rythm    F: PRN  E: k>4 Mg>2  N: low sodium diet, 1.6L fluid restriction   GI ppx: PPI  DVT ppx: heparin gtt, transition to apixaban following para    Dispo: pending PT/OT eval     Full code  Next of kin: Daughter (Karlee) : [phone]       José Michaels, RU GABO MS3

## 2023-11-30 NOTE — CARE PLAN
The patient's goals for the shift include sleeping    The clinical goals for the shift include pt to have restful night of sleep while having decreased level of pain    Over the shift, the patient did make progress toward the following goals.     Problem: Fall/Injury  Goal: Not fall by end of shift  Outcome: Progressing  Goal: Be free from injury by end of the shift  Outcome: Progressing  Goal: Verbalize understanding of personal risk factors for fall in the hospital  Outcome: Progressing  Goal: Verbalize understanding of risk factor reduction measures to prevent injury from fall in the home  Outcome: Progressing  Goal: Use assistive devices by end of the shift  Outcome: Progressing  Goal: Pace activities to prevent fatigue by end of the shift  Outcome: Progressing     Problem: Pain  Goal: Takes deep breaths with improved pain control throughout the shift  Outcome: Progressing  Goal: Turns in bed with improved pain control throughout the shift  Outcome: Progressing  Goal: Walks with improved pain control throughout the shift  Outcome: Progressing  Goal: Performs ADL's with improved pain control throughout shift  Outcome: Progressing  Goal: Participates in PT with improved pain control throughout the shift  Outcome: Progressing  Goal: Free from opioid side effects throughout the shift  Outcome: Progressing  Goal: Free from acute confusion related to pain meds throughout the shift  Outcome: Progressing

## 2023-11-30 NOTE — PROGRESS NOTES
Occupational Therapy    Evaluation    Patient Name: Junito Rodríguez  MRN: [id number]  Today's Date: 11/30/2023  Time Calculation  Start Time: 1046  Stop Time: 1120  Time Calculation (min): 34 min    Assessment  IP OT Assessment  Evaluation/Treatment Tolerance: Patient tolerated treatment well  End of Session Communication: Bedside nurse  End of Session Patient Position:  (REQUESTED TO REMAIN SEATED AT EOB TO EAT)  Plan:  No Skilled OT: No acute OT goals identified  OT Discharge Recommendations: No further acute OT (EDUCATED FOR HOME OT SAFETY EVALUATION TO MAXIMIZE EFFICIENCY OF I/ADL TASK PERFORMANCE WITH USE OF ECT AND FOR AE + DME OPTIONS. PATIENT DECLINES AT THIS TIME.)  OT - OK to Discharge: Yes    Subjective   Current Problem:  1. Other ascites  Ceruloplasmin    ELA with Reflex to LIZET    Celiac Panel    Hemoglobin A1C    Liver Elastography    Hemoglobin A1C    Hemoglobin A1C    Vascular US abdomen/pelvis duplex complete    Vascular US abdomen/pelvis duplex complete    Body Fluid Cell Count With Differential    Albumin    Protein, Total    Body Fluid Cell Count With Differential    Body Fluid Cell Count With Differential      2. Generalized abdominal pain        3. Acute pulmonary embolism without acute cor pulmonale, unspecified pulmonary embolism type (CMS/HCC)  Transthoracic Echo (TTE) Complete    Transthoracic Echo (TTE) Complete    apixaban 5 mg (74 tabs) tablets,dose pack      4. Deep vein thrombosis (DVT) of distal vein of lower extremity, unspecified chronicity, unspecified laterality (CMS/HCC)        5. Swelling of both lower extremities  Lower extremity venous duplex bilateral    Lower extremity venous duplex bilateral      6. Chronic liver disease  Ceruloplasmin    ELA with Reflex to LIZET    Celiac Panel    Lipid Panel    Ceruloplasmin    Ceruloplasmin    ELA with Reflex to LIZET    ELA with Reflex to LIZET    Celiac Panel    Celiac Panel    Lipid Panel    Lipid Panel    LIZET Panel    LIZET Panel      7.  Abnormal finding of blood chemistry, unspecified  Hemoglobin A1C    Hemoglobin A1C    Hemoglobin A1C        General:  General  Reason for Referral: Patient presented to the ED with 7-day history of leg swelling, abdominal swelling, shortness of breath, and anorexia.  Past Medical History Relevant to Rehab: Junito Rodríguez is a 62 y.o. male with past medical history of depression, anxiety, COPD(PFTs not on file), alcohol abuse disorder, significant tobacco use, and chronic lower back pain.  Family/Caregiver Present: No  Prior to Session Communication: Bedside nurse  Patient Position Received: Bed, 2 rail up, Alarm off, not on at start of session  Preferred Learning Style: verbal  General Comment: RN CLEARED; UPON APPROACH AWILDA WAS SUPINE IN BED. HE PLEASANTLY VERBALIZED FULL RECEPTIVITY TO PARTICIPATION.  Precautions:  Medical Precautions: Fall precautions  Vital Signs:  Heart Rate: 96  Heart Rate Source: Monitor  SpO2: 99 %  BP: 113/74  BP Location: Left arm  Patient Position: Lying  Pain:  Pain Assessment  Pain Assessment: 0-10  Pain Score: 0 - No pain    Objective   Cognition:  Overall Cognitive Status: Within Functional Limits  Attention: Within Functional Limits  Safety/Judgement: Within Functional Limits           Home Living:  Type of Home: Apartment ([facility] FLR WITH ELEVATOR)  Lives With: Alone (PER PATIENT REPORT BROTHER AND COUSIN RESIDE IN THE SAME BUILDING AND ARE AVAILABLE TO PROVIDE PRN ASSIST)  Home Adaptive Equipment: Cane  Home Layout: One level  Home Access: Level entry  Bathroom Shower/Tub: Tub/shower unit  Bathroom Toilet: Standard  Bathroom Equipment: Grab bars in shower   Prior Function:  Level of [place]: Independent with ADLs and functional transfers, Independent with homemaking with ambulation  Vocational: Retired ()  Leisure: TV, GAMES, TIME WITH FAMILY  Hand Dominance: Right  Prior Function Comments: AWILDA REPOTS BEING CLOSE ENOUGH TO BASELINE TO RETURN TO TREVOR; VERBALIZES PLANS  TO TO UTILIZE FAMILY SUPPORT SYSTEM IF/AS NEEDED  IADL History:  Homemaking Responsibilities: Yes  Meal Prep Responsibility: Primary  Laundry Responsibility: Primary  Cleaning Responsibility: Primary  Shopping Responsibility: Primary  Homemaking Comments: REPORTS INDEPENDENT PERFORMANCE OF  IADLS  Mode of Transportation: Car  ADL:  Equipment:  (DENIES OWNERSHIP OF AE)  Eating Assistance: Independent  Grooming Assistance:  (PERFORMS GROOMING TASK WITHOUT ASSISTANCE ONCE SUPPLIES ARE PROVIDED)  Grooming Deficit: Setup  Bathing Assistance: Modified independent (Device) (ANTICIPATED)  UE Dressing Assistance: Independent  LE Dressing Assistance: Modified independent (Device)  Toileting Assistance with Device: Modified independent  Activity Tolerance:     Bed Mobility/Transfers: Bed Mobility  Bed Mobility: Yes (INDEPENDENT FOR SUPINE < > SIT)    Transfers  Transfer:  (COMPLETED SIT < > STAND FROM TOILET AND EOB W/O AD)  Modalities:     IADL's:   Homemaking Responsibilities: Yes  Meal Prep Responsibility: Primary  Laundry Responsibility: Primary  Cleaning Responsibility: Primary  Shopping Responsibility: Primary  Homemaking Comments: REPORTS INDEPENDENT PERFORMANCE OF  IADLS  Mode of Transportation: Car  Vision: Vision - Basic Assessment  Current Vision: No visual deficits (NONE OBSERVED)  Sensation:  Light Touch: No apparent deficits  Strength:  Strength Comments: BUE NOTED TO BE  >/= 4-/5 DURING FUNCTIONAL TASKS  Perception:     Coordination:      Hand Function:  Hand Function  Gross Grasp: Functional  Extremities: RUE   RUE : Within Functional Limits and LUE   LUE: Within Functional Limits    Outcome Measures: [facility] Daily Activity  Putting on and taking off regular lower body clothing: None  Bathing (including washing, rinsing, drying): None  Putting on and taking off regular upper body clothing: None  Toileting, which includes using toilet, bedpan or urinal: None  Taking care of personal grooming such as brushing  teeth: None  Eating Meals: None  Daily Activity - Total Score: 24         and Brief Confusion Assessment Method (bCAM)  CAM Result: CAM -

## 2023-11-30 NOTE — CARE PLAN
The patient's goals for the shift include      The clinical goals for the shift include Pt will have no c/o of pain or rate pain <4 this shift

## 2023-12-01 VITALS
BODY MASS INDEX: 16.29 KG/M2 | SYSTOLIC BLOOD PRESSURE: 92 MMHG | OXYGEN SATURATION: 99 % | DIASTOLIC BLOOD PRESSURE: 60 MMHG | WEIGHT: 110 LBS | RESPIRATION RATE: 18 BRPM | HEIGHT: 69 IN | TEMPERATURE: 101.5 F | HEART RATE: 94 BPM

## 2023-12-01 LAB
ALBUMIN SERPL BCP-MCNC: 2.1 G/DL (ref 3.4–5)
ALP SERPL-CCNC: 80 U/L (ref 33–136)
ALT SERPL W P-5'-P-CCNC: 14 U/L (ref 10–52)
ANION GAP SERPL CALC-SCNC: 11 MMOL/L (ref 10–20)
AST SERPL W P-5'-P-CCNC: 30 U/L (ref 9–39)
BACTERIA FLD CULT: NORMAL
BASOPHILS # BLD AUTO: 0.02 X10*3/UL (ref 0–0.1)
BASOPHILS NFR BLD AUTO: 0.3 %
BILIRUB DIRECT SERPL-MCNC: 0.1 MG/DL (ref 0–0.3)
BILIRUB SERPL-MCNC: 0.4 MG/DL (ref 0–1.2)
BUN SERPL-MCNC: 7 MG/DL (ref 6–23)
CALCIUM SERPL-MCNC: 7.8 MG/DL (ref 8.6–10.6)
CHLORIDE SERPL-SCNC: 100 MMOL/L (ref 98–107)
CO2 SERPL-SCNC: 27 MMOL/L (ref 21–32)
CREAT SERPL-MCNC: 0.85 MG/DL (ref 0.5–1.3)
EOSINOPHIL # BLD AUTO: 0.02 X10*3/UL (ref 0–0.7)
EOSINOPHIL NFR BLD AUTO: 0.3 %
ERYTHROCYTE [DISTWIDTH] IN BLOOD BY AUTOMATED COUNT: 17.9 % (ref 11.5–14.5)
GFR SERPL CREATININE-BSD FRML MDRD: >90 ML/MIN/1.73M*2
GLUCOSE SERPL-MCNC: 124 MG/DL (ref 74–99)
GRAM STN SPEC: NORMAL
GRAM STN SPEC: NORMAL
HCT VFR BLD AUTO: 27.7 % (ref 41–52)
HGB BLD-MCNC: 9.2 G/DL (ref 13.5–17.5)
IMM GRANULOCYTES # BLD AUTO: 0.03 X10*3/UL (ref 0–0.7)
IMM GRANULOCYTES NFR BLD AUTO: 0.4 % (ref 0–0.9)
INR PPP: 1.5 (ref 0.9–1.1)
LYMPHOCYTES # BLD AUTO: 1.99 X10*3/UL (ref 1.2–4.8)
LYMPHOCYTES NFR BLD AUTO: 26.6 %
MAGNESIUM SERPL-MCNC: 1.78 MG/DL (ref 1.6–2.4)
MCH RBC QN AUTO: 35.5 PG (ref 26–34)
MCHC RBC AUTO-ENTMCNC: 33.2 G/DL (ref 32–36)
MCV RBC AUTO: 107 FL (ref 80–100)
MONOCYTES # BLD AUTO: 0.62 X10*3/UL (ref 0.1–1)
MONOCYTES NFR BLD AUTO: 8.3 %
NEUTROPHILS # BLD AUTO: 4.79 X10*3/UL (ref 1.2–7.7)
NEUTROPHILS NFR BLD AUTO: 64.1 %
NRBC BLD-RTO: 0 /100 WBCS (ref 0–0)
PLATELET # BLD AUTO: 253 X10*3/UL (ref 150–450)
POTASSIUM SERPL-SCNC: 4.2 MMOL/L (ref 3.5–5.3)
PROT SERPL-MCNC: 4.8 G/DL (ref 6.4–8.2)
PROTHROMBIN TIME: 17.4 SECONDS (ref 9.8–12.8)
RBC # BLD AUTO: 2.59 X10*6/UL (ref 4.5–5.9)
SODIUM SERPL-SCNC: 134 MMOL/L (ref 136–145)
WBC # BLD AUTO: 7.5 X10*3/UL (ref 4.4–11.3)

## 2023-12-01 PROCEDURE — 36415 COLL VENOUS BLD VENIPUNCTURE: CPT

## 2023-12-01 PROCEDURE — 85025 COMPLETE CBC W/AUTO DIFF WBC: CPT

## 2023-12-01 PROCEDURE — 85610 PROTHROMBIN TIME: CPT

## 2023-12-01 PROCEDURE — S4991 NICOTINE PATCH NONLEGEND: HCPCS

## 2023-12-01 PROCEDURE — 99239 HOSP IP/OBS DSCHRG MGMT >30: CPT | Performed by: INTERNAL MEDICINE

## 2023-12-01 PROCEDURE — 80053 COMPREHEN METABOLIC PANEL: CPT

## 2023-12-01 PROCEDURE — 82248 BILIRUBIN DIRECT: CPT

## 2023-12-01 PROCEDURE — 2500000001 HC RX 250 WO HCPCS SELF ADMINISTERED DRUGS (ALT 637 FOR MEDICARE OP)

## 2023-12-01 PROCEDURE — 2500000004 HC RX 250 GENERAL PHARMACY W/ HCPCS (ALT 636 FOR OP/ED)

## 2023-12-01 PROCEDURE — 83735 ASSAY OF MAGNESIUM: CPT

## 2023-12-01 PROCEDURE — 2500000002 HC RX 250 W HCPCS SELF ADMINISTERED DRUGS (ALT 637 FOR MEDICARE OP, ALT 636 FOR OP/ED)

## 2023-12-01 PROCEDURE — 2500000001 HC RX 250 WO HCPCS SELF ADMINISTERED DRUGS (ALT 637 FOR MEDICARE OP): Performed by: STUDENT IN AN ORGANIZED HEALTH CARE EDUCATION/TRAINING PROGRAM

## 2023-12-01 PROCEDURE — 2500000004 HC RX 250 GENERAL PHARMACY W/ HCPCS (ALT 636 FOR OP/ED): Performed by: RADIOLOGY

## 2023-12-01 RX ORDER — MULTIVIT-MIN/FOLIC/VIT K/LYCOP 400-300MCG
140 TABLET ORAL ONCE
Status: DISCONTINUED | OUTPATIENT
Start: 2023-12-01 | End: 2023-12-01 | Stop reason: HOSPADM

## 2023-12-01 RX ADMIN — PANTOPRAZOLE SODIUM 40 MG: 40 TABLET, DELAYED RELEASE ORAL at 07:00

## 2023-12-01 RX ADMIN — MIDODRINE HYDROCHLORIDE 5 MG: 5 TABLET ORAL at 08:10

## 2023-12-01 RX ADMIN — FUROSEMIDE 20 MG: 20 TABLET ORAL at 08:11

## 2023-12-01 RX ADMIN — Medication 1 TABLET: at 08:10

## 2023-12-01 RX ADMIN — SPIRONOLACTONE 50 MG: 25 TABLET, FILM COATED ORAL at 08:10

## 2023-12-01 RX ADMIN — FOLIC ACID 1 MG: 1 TABLET ORAL at 08:11

## 2023-12-01 RX ADMIN — RISPERIDONE 1 MG: 1 TABLET ORAL at 08:10

## 2023-12-01 RX ADMIN — FLUTICASONE FUROATE AND VILANTEROL TRIFENATATE 1 PUFF: 200; 25 POWDER RESPIRATORY (INHALATION) at 09:00

## 2023-12-01 RX ADMIN — THIAMINE HCL TAB 100 MG 100 MG: 100 TAB at 08:10

## 2023-12-01 RX ADMIN — LACTULOSE 20 G: 20 SOLUTION ORAL at 08:11

## 2023-12-01 RX ADMIN — APIXABAN 10 MG: 5 TABLET, FILM COATED ORAL at 08:10

## 2023-12-01 RX ADMIN — Medication 800 MG: at 08:10

## 2023-12-01 RX ADMIN — Medication 1 PATCH: at 08:12

## 2023-12-01 ASSESSMENT — COGNITIVE AND FUNCTIONAL STATUS - GENERAL
DAILY ACTIVITIY SCORE: 24
MOBILITY SCORE: 24

## 2023-12-01 ASSESSMENT — PAIN SCALES - GENERAL: PAINLEVEL_OUTOF10: 0 - NO PAIN

## 2023-12-01 NOTE — NURSING NOTE
12/01/23  Nursing note: patient discharged to home   with his Family also went home with Medications from [facility] - Meds-to Bed  RN discussed discharge instructions  Patient Verbalized understanding,and copy of Discharge summary given to Patient, E) Status stable  Hallie Hirsch

## 2023-12-01 NOTE — DISCHARGE INSTRUCTIONS
Discharge Instructions    Dear Junito Rodríguez,    You were admitted to [facility] for blood clots in your leg and your lungs, and cirrhosis in your liver. When you initially came in for swelling in your legs, we discovered these were due to blood clots in your right leg and both lungs, and cirrhosis of your liver which caused the fluid and pain around your abdomen. For your blood clots, we started you on a blood thinners and you will need to keep taking these. For your cirrhosis and abdominal fluid, we drained the fluid and gave you water pills to help you urinate the extra fluid out. We also gave you a laxative called lactulose. Please continue taking the water pills every day, and if you wake up very dizzy or lightheaded, do not take your water pills and call your doctor. Please take your lactulose laxative every day and take enough to have 2 -3 bowel movements a day. Please continue to take the vitamin supplements we are providing you with. Also, we made some changes to your diet. Please watch your salt intake, less than 2g a day, and drink less than 1.5L or 50oz of fluids a day. Please call your doctor if you notice any blood in your stool or if you are throwing up blood.  Lastly, we would like you to see your doctor within 1 week and get lab work including liver and kidney function tests.     Medication changes:  Start taking Apixaban. You will take 10mg pills until 12/5, and then switch to the 5mg pills. '  Start taking furosemide and spirinolactone/aldactone and make sure you take these every day, these are your water pills.  Take lactulose laxative and take enough to have 2-3 bowel movements a day    Appointments/follow-up:     We have requested an automated system to call you to schedule, however if you do not hear from them in next few days, please call [facility] appointment line: [phone] or [phone]    Liver doctor      It was a pleasure taking care of you,  Your  Care Team

## 2023-12-01 NOTE — CARE PLAN
The patient's goals for the shift include      The clinical goals for the shift include Pt will have no c/o of pain or rate pain <4 this shift    Over the shift, the patient did not make progress toward the following goals. Barriers to progression include . Recommendations to address these barriers include   Problem: Fall/Injury  Goal: Not fall by end of shift  Outcome: Progressing  Goal: Be free from injury by end of the shift  Outcome: Progressing  Goal: Verbalize understanding of personal risk factors for fall in the hospital  Outcome: Progressing  Goal: Verbalize understanding of risk factor reduction measures to prevent injury from fall in the home  Outcome: Progressing  Goal: Use assistive devices by end of the shift  Outcome: Progressing  Goal: Pace activities to prevent fatigue by end of the shift  Outcome: Progressing     Problem: Pain  Goal: Takes deep breaths with improved pain control throughout the shift  Outcome: Progressing  Goal: Turns in bed with improved pain control throughout the shift  Outcome: Progressing  Goal: Walks with improved pain control throughout the shift  Outcome: Progressing  Goal: Performs ADL's with improved pain control throughout shift  Outcome: Progressing  Goal: Participates in PT with improved pain control throughout the shift  Outcome: Progressing  Goal: Free from opioid side effects throughout the shift  Outcome: Progressing  Goal: Free from acute confusion related to pain meds throughout the shift  Outcome: Progressing   .

## 2023-12-01 NOTE — DISCHARGE SUMMARY
Discharge Diagnosis  Acute pulmonary embolism without acute cor pulmonale, unspecified pulmonary embolism type (CMS/HCC)  Acute decompensated cirrhosis 2/2 alcohol use disorder    Issues Requiring Follow-Up  Follow up with Hepatology regarding new cirrhosis  Will need labs drawn in 1 week including hepatic function panel, coags, and renal function panel  Will need to continue anticoagulation for new PE      Test Results Pending At Discharge  Pending Labs       Order Current Status    Non-gynecologic cytology Collected (11/28/23 1119)    THC (Marijuana), Urine, Confirmation In process            Hospital Course  Junito Rodríguez is a 62 y.o. male PMHx of schizophrenia, depression, anxiety, COPD (PFTs not on file), alcohol use disorder, significant tobacco use, and chronic lower back pain.  Patient presented to the ED with 7-day history of leg swelling, abdominal swelling, shortness of breath, and anorexia. Diagnosed with decompensated cirrhosis likely due to alcohol use and bilateral PEs without evidence of right heart strain or hypoxia, extensive R lower extremity DVTs, and IVC thrombus likely provoked in the setting of decreased mobility. Started heparin 11/24, which was switched to Apixaban post paracentesis (11/28). Echo performed on 11/25 showed hyperdynamic EF and no evidence of RV strain. Patient placed on ceftriaxone prophylaxis (11/24) for SBP. Paracentesis (11/28) showed SAAG of 1.5 and without evidence of SBP, thus, ceftriaxone was stopped (11/28). Lactulose was given for hepatic encephalopathy prophylaxis, and Furosamide 20 mg started (11/29) to target excess fluid.  After patient reported difficulty sleeping (11/28), melatonin 3 mg was unsuccessful, and switched to Melatonin 5 mg and Trazodone 50 mg (11/29). CIWA was discontinued (11/28) following 4 days scores of 0. Given persistent mild tachycardia, ECG was scheduled (11/29) and showed normal rhythm.  Spironolactone 50 mg started (11/30) for further  diuresis, with midodrine added for soft Bps to 90s systolic and he remained hemodynamically stable with adequate UOP. On day of discharge, he was HDS, clinically improved, and stable for discharge home.         Pertinent Physical Exam At Time of Discharge  Vitals:    12/01/23 0422   BP: 92/60   Pulse: 94   Resp: 18   Temp: 38.6 °C (101.5 °F)   SpO2: 99%       Physical Exam:  General: Alert and in no apparent distress. Speaks coherently and without difficulty. States that he feels particularly good this morning.   HEENT: normocephalic, atraumatic, EOMI, no scleral icterus  CV: RRR, no murmurs or gallops. Pedal pulse slightly diminished in right foot compared to left.  Pulm: No wheezes or crackles, work of breathing is appropriate  Abd: Soft, distended, minimal shifting dullness, nontender. Paracentesis insertion site looks clean and non erythematous.  Skin: no rashes, no spider angiomas seen   Neuro: moving all extremities. RLE and LLE extremities sensation intact to light touch and palpation both distally and proximally  MSK: The right foot was atraumatic, with full range of motion, calor and full sensation. The DVT wraps looks in proper position, covering the length of the foot and upper RLE. Diminished pain upon palpation compared to the previous day. Intact sensation to fine touch. Left foot displays no signs of swelling or inflammation  Psych: normal affect and cognition    Home Medications     Medication List      START taking these medications     Breo Ellipta 200-25 mcg/dose inhaler; Generic drug: fluticasone   furoate-vilanteroL; Inhale 1 puff once daily. Do not start before December 1, 2023.   Certavite-Antioxidant tablet; Generic drug: multivitamin with minerals;   Take 1 tablet by mouth once daily. Do not start before December 1, 2023.   Eliquis DVT-PE Treat 30D Start 5 mg (74 tabs) tablets,dose pack; Generic   drug: apixaban; Take 2 tablets (10 mg) by mouth 2 times a day for 7 days,   THEN 1 tablet (5  mg) 2 times a day for 23 days.; Start taking on: November 30, 2023   folic acid 1 mg tablet; Commonly known as: Folvite; Take 1 tablet (1 mg)   by mouth once daily. Do not start before December 1, 2023.   furosemide 20 mg tablet; Commonly known as: Lasix; Take 1 tablet (20 mg)   by mouth once daily. Do not start before December 1, 2023.   gabapentin 300 mg capsule; Commonly known as: Neurontin; Take 1 capsule   (300 mg) by mouth once daily at bedtime.   lactulose 20 gram/30 mL oral solution; Take 30 mL (20 g) by mouth once   daily. Do not start before December 1, 2023.   melatonin 5 mg tablet; Take 1 tablet (5 mg) by mouth once daily in the   evening.   midodrine 5 mg tablet; Commonly known as: Proamatine; Take 1 tablet (5   mg) by mouth 3 times a day with meals.   nicotine 14 mg/24 hr patch; Commonly known as: Nicoderm CQ; Place 1   patch over 24 hours on the skin once daily. Do not start before December 1, 2023.   pantoprazole 40 mg EC tablet; Commonly known as: ProtoNix; Take 1 tablet   (40 mg) by mouth once daily in the morning. Take before meals. Do not   crush, chew, or split. Do not start before December 1, 2023.   risperiDONE 1 mg tablet; Commonly known as: RisperDAL; Take 1 tablet (1   mg) by mouth 2 times a day.   spironolactone 50 mg tablet; Commonly known as: Aldactone; Take 1 tablet   (50 mg) by mouth once daily. Do not start before December 1, 2023.   thiamine 100 mg tablet; Commonly known as: Vitamin B-1; Take 1 tablet   (100 mg) by mouth once daily. Do not start before December 1, 2023.   traZODone 50 mg tablet; Commonly known as: Desyrel; Take 1 tablet (50   mg) by mouth once daily at bedtime.       Outpatient Follow-Up  No future appointments.    Adriano Braun MD

## 2023-12-04 LAB — CARBOXYTHC UR-MCNC: 322 NG/ML

## 2024-01-02 ENCOUNTER — APPOINTMENT (OUTPATIENT)
Dept: GASTROENTEROLOGY | Facility: HOSPITAL | Age: 64
End: 2024-01-02
Payer: MEDICARE

## 2024-03-25 ENCOUNTER — APPOINTMENT (OUTPATIENT)
Dept: RADIOLOGY | Facility: HOSPITAL | Age: 64
End: 2024-03-25
Payer: MEDICARE

## 2024-03-25 ENCOUNTER — HOSPITAL ENCOUNTER (EMERGENCY)
Facility: HOSPITAL | Age: 64
Discharge: HOME | End: 2024-03-25
Attending: EMERGENCY MEDICINE
Payer: MEDICARE

## 2024-03-25 VITALS
BODY MASS INDEX: 17.33 KG/M2 | RESPIRATION RATE: 18 BRPM | SYSTOLIC BLOOD PRESSURE: 114 MMHG | DIASTOLIC BLOOD PRESSURE: 77 MMHG | HEART RATE: 99 BPM | OXYGEN SATURATION: 97 % | TEMPERATURE: 97.9 F | WEIGHT: 117 LBS | HEIGHT: 69 IN

## 2024-03-25 DIAGNOSIS — G57.93 NEUROPATHY INVOLVING BOTH LOWER EXTREMITIES: Primary | ICD-10-CM

## 2024-03-25 LAB
ALBUMIN SERPL BCP-MCNC: 3.6 G/DL (ref 3.4–5)
ALP SERPL-CCNC: 85 U/L (ref 33–136)
ALT SERPL W P-5'-P-CCNC: 8 U/L (ref 10–52)
ANION GAP SERPL CALC-SCNC: 14 MMOL/L (ref 10–20)
APPEARANCE UR: CLEAR
AST SERPL W P-5'-P-CCNC: 17 U/L (ref 9–39)
BASOPHILS # BLD AUTO: 0.05 X10*3/UL (ref 0–0.1)
BASOPHILS NFR BLD AUTO: 0.6 %
BILIRUB SERPL-MCNC: 0.3 MG/DL (ref 0–1.2)
BILIRUB UR STRIP.AUTO-MCNC: NEGATIVE MG/DL
BUN SERPL-MCNC: 6 MG/DL (ref 6–23)
CALCIUM SERPL-MCNC: 9 MG/DL (ref 8.6–10.6)
CHLORIDE SERPL-SCNC: 96 MMOL/L (ref 98–107)
CK SERPL-CCNC: 38 U/L (ref 0–325)
CO2 SERPL-SCNC: 30 MMOL/L (ref 21–32)
COLOR UR: COLORLESS
CREAT SERPL-MCNC: 0.72 MG/DL (ref 0.5–1.3)
EGFRCR SERPLBLD CKD-EPI 2021: >90 ML/MIN/1.73M*2
EOSINOPHIL # BLD AUTO: 0.32 X10*3/UL (ref 0–0.7)
EOSINOPHIL NFR BLD AUTO: 3.6 %
ERYTHROCYTE [DISTWIDTH] IN BLOOD BY AUTOMATED COUNT: 14.2 % (ref 11.5–14.5)
GLUCOSE SERPL-MCNC: 68 MG/DL (ref 74–99)
GLUCOSE UR STRIP.AUTO-MCNC: NORMAL MG/DL
HCT VFR BLD AUTO: 36.7 % (ref 41–52)
HGB BLD-MCNC: 12.6 G/DL (ref 13.5–17.5)
IMM GRANULOCYTES # BLD AUTO: 0.02 X10*3/UL (ref 0–0.7)
IMM GRANULOCYTES NFR BLD AUTO: 0.2 % (ref 0–0.9)
KETONES UR STRIP.AUTO-MCNC: NEGATIVE MG/DL
LEUKOCYTE ESTERASE UR QL STRIP.AUTO: NEGATIVE
LYMPHOCYTES # BLD AUTO: 3.78 X10*3/UL (ref 1.2–4.8)
LYMPHOCYTES NFR BLD AUTO: 42.6 %
MCH RBC QN AUTO: 30.4 PG (ref 26–34)
MCHC RBC AUTO-ENTMCNC: 34.3 G/DL (ref 32–36)
MCV RBC AUTO: 88 FL (ref 80–100)
MONOCYTES # BLD AUTO: 0.81 X10*3/UL (ref 0.1–1)
MONOCYTES NFR BLD AUTO: 9.1 %
NEUTROPHILS # BLD AUTO: 3.9 X10*3/UL (ref 1.2–7.7)
NEUTROPHILS NFR BLD AUTO: 43.9 %
NITRITE UR QL STRIP.AUTO: NEGATIVE
NRBC BLD-RTO: 0 /100 WBCS (ref 0–0)
PH UR STRIP.AUTO: 5 [PH]
PLATELET # BLD AUTO: 272 X10*3/UL (ref 150–450)
POTASSIUM SERPL-SCNC: 3.5 MMOL/L (ref 3.5–5.3)
PROT SERPL-MCNC: 7 G/DL (ref 6.4–8.2)
PROT UR STRIP.AUTO-MCNC: NEGATIVE MG/DL
RBC # BLD AUTO: 4.15 X10*6/UL (ref 4.5–5.9)
RBC # UR STRIP.AUTO: NEGATIVE /UL
SODIUM SERPL-SCNC: 136 MMOL/L (ref 136–145)
SP GR UR STRIP.AUTO: 1
UROBILINOGEN UR STRIP.AUTO-MCNC: NORMAL MG/DL
WBC # BLD AUTO: 8.9 X10*3/UL (ref 4.4–11.3)

## 2024-03-25 PROCEDURE — 99284 EMERGENCY DEPT VISIT MOD MDM: CPT | Performed by: EMERGENCY MEDICINE

## 2024-03-25 PROCEDURE — 82550 ASSAY OF CK (CPK): CPT | Performed by: EMERGENCY MEDICINE

## 2024-03-25 PROCEDURE — 81003 URINALYSIS AUTO W/O SCOPE: CPT | Performed by: EMERGENCY MEDICINE

## 2024-03-25 PROCEDURE — 2500000001 HC RX 250 WO HCPCS SELF ADMINISTERED DRUGS (ALT 637 FOR MEDICARE OP)

## 2024-03-25 PROCEDURE — 36415 COLL VENOUS BLD VENIPUNCTURE: CPT

## 2024-03-25 PROCEDURE — 99284 EMERGENCY DEPT VISIT MOD MDM: CPT

## 2024-03-25 PROCEDURE — 85025 COMPLETE CBC W/AUTO DIFF WBC: CPT

## 2024-03-25 PROCEDURE — 84075 ASSAY ALKALINE PHOSPHATASE: CPT

## 2024-03-25 PROCEDURE — 93970 EXTREMITY STUDY: CPT

## 2024-03-25 PROCEDURE — 93971 EXTREMITY STUDY: CPT | Performed by: STUDENT IN AN ORGANIZED HEALTH CARE EDUCATION/TRAINING PROGRAM

## 2024-03-25 RX ORDER — IBUPROFEN 400 MG/1
400 TABLET ORAL ONCE
Status: DISCONTINUED | OUTPATIENT
Start: 2024-03-25 | End: 2024-03-25

## 2024-03-25 RX ORDER — OXYCODONE AND ACETAMINOPHEN 5; 325 MG/1; MG/1
1 TABLET ORAL ONCE
Status: COMPLETED | OUTPATIENT
Start: 2024-03-25 | End: 2024-03-25

## 2024-03-25 RX ORDER — IBUPROFEN 400 MG/1
800 TABLET ORAL ONCE
Status: COMPLETED | OUTPATIENT
Start: 2024-03-25 | End: 2024-03-25

## 2024-03-25 RX ORDER — ACETAMINOPHEN 325 MG/1
975 TABLET ORAL ONCE
Status: DISCONTINUED | OUTPATIENT
Start: 2024-03-25 | End: 2024-03-25

## 2024-03-25 RX ORDER — GABAPENTIN 300 MG/1
600 CAPSULE ORAL NIGHTLY
Qty: 14 CAPSULE | Refills: 0 | Status: SHIPPED | OUTPATIENT
Start: 2024-03-25 | End: 2024-04-01

## 2024-03-25 RX ORDER — ACETAMINOPHEN 325 MG/1
TABLET ORAL
Status: DISCONTINUED
Start: 2024-03-25 | End: 2024-03-25 | Stop reason: HOSPADM

## 2024-03-25 RX ORDER — IBUPROFEN 400 MG/1
TABLET ORAL
Status: COMPLETED
Start: 2024-03-25 | End: 2024-03-25

## 2024-03-25 RX ADMIN — IBUPROFEN 800 MG: 400 TABLET ORAL at 18:12

## 2024-03-25 RX ADMIN — OXYCODONE HYDROCHLORIDE AND ACETAMINOPHEN 1 TABLET: 5; 325 TABLET ORAL at 21:14

## 2024-03-25 RX ADMIN — IBUPROFEN 800 MG: 400 TABLET, FILM COATED ORAL at 18:12

## 2024-03-25 ASSESSMENT — LIFESTYLE VARIABLES
EVER FELT BAD OR GUILTY ABOUT YOUR DRINKING: NO
HAVE YOU EVER FELT YOU SHOULD CUT DOWN ON YOUR DRINKING: NO
HAVE PEOPLE ANNOYED YOU BY CRITICIZING YOUR DRINKING: NO
EVER HAD A DRINK FIRST THING IN THE MORNING TO STEADY YOUR NERVES TO GET RID OF A HANGOVER: NO
TOTAL SCORE: 0

## 2024-03-25 ASSESSMENT — PAIN SCALES - GENERAL
PAINLEVEL_OUTOF10: 10 - WORST POSSIBLE PAIN
PAINLEVEL_OUTOF10: 10 - WORST POSSIBLE PAIN

## 2024-03-25 ASSESSMENT — COLUMBIA-SUICIDE SEVERITY RATING SCALE - C-SSRS
2. HAVE YOU ACTUALLY HAD ANY THOUGHTS OF KILLING YOURSELF?: NO
1. IN THE PAST MONTH, HAVE YOU WISHED YOU WERE DEAD OR WISHED YOU COULD GO TO SLEEP AND NOT WAKE UP?: NO
6. HAVE YOU EVER DONE ANYTHING, STARTED TO DO ANYTHING, OR PREPARED TO DO ANYTHING TO END YOUR LIFE?: NO

## 2024-03-25 ASSESSMENT — PAIN - FUNCTIONAL ASSESSMENT
PAIN_FUNCTIONAL_ASSESSMENT: 0-10

## 2024-03-25 ASSESSMENT — PAIN DESCRIPTION - LOCATION
LOCATION: LEG
LOCATION: LEG

## 2024-03-25 ASSESSMENT — PAIN DESCRIPTION - ORIENTATION: ORIENTATION: RIGHT;LEFT

## 2024-03-25 ASSESSMENT — PAIN DESCRIPTION - DESCRIPTORS
DESCRIPTORS: ACHING
DESCRIPTORS: ACHING

## 2024-03-25 ASSESSMENT — PAIN DESCRIPTION - FREQUENCY: FREQUENCY: CONSTANT/CONTINUOUS

## 2024-03-25 ASSESSMENT — PAIN DESCRIPTION - PAIN TYPE
TYPE: ACUTE PAIN
TYPE: ACUTE PAIN

## 2024-03-25 ASSESSMENT — PAIN DESCRIPTION - PROGRESSION: CLINICAL_PROGRESSION: NOT CHANGED

## 2024-03-25 NOTE — ED PROVIDER NOTES
HPI   Chief Complaint   Patient presents with    Leg Pain       HPI  Patient is a 63-year-old male with past medical history of alcoholic liver cirrhosis c/b extensive right DVT with IVC thrombus and bilateral PE in November 2023, presenting with worsening bilateral leg pain for the past few weeks.  Patient states the pain is sharp in nature with associated numbness/tingling that radiates from his feet up to the calves.  Patient has been taking Eliquis since he was discovered to have DVTs in November he reports compliance with his medication.  Patient states that his pain is constant and prevents him from sleeping at night.  He states that it is worse when he walks and puts weight on his feet.  He has tried to take gabapentin however states this does not help at all.  Patient states that a few months ago his legs were extremely swollen and that this had resolved over time but the pain in his lower legs and feet has progressively become worse despite improvement in swelling.  Patient denies any other symptoms at this time including shortness of breath, chest pain, abdominal pain, headache, dizziness, fever/chills, urinary symptoms.                  [place] Coma Scale Score: 15                     Patient History   History reviewed. No pertinent past medical history.  Past Surgical History:   Procedure Laterality Date    OTHER SURGICAL HISTORY  02/08/2018    Closed Treatment Of Orbital Fracture (Non-'blowout')    US GUIDED ABDOMINAL PARACENTESIS  11/28/2023    US GUIDED ABDOMINAL PARACENTESIS 11/28/2023 [facility] [place]     No family history on file.  Social History     Tobacco Use    Smoking status: Every Day     Types: Cigarettes    Smokeless tobacco: Not on file   Substance Use Topics    Alcohol use: Not on file    Drug use: Not on file       Physical Exam   ED Triage Vitals   Temperature Heart Rate Respirations BP   03/25/24 1315 03/25/24 1315 03/25/24 1315 03/25/24 1315   36.2 °C (97.1 °F) 91 16 108/70      Pulse Ox Temp  Source Heart Rate Source Patient Position   03/25/24 1315 03/25/24 1315 03/25/24 1511 03/25/24 1315   97 % Temporal Monitor Sitting      BP Location FiO2 (%)     03/25/24 1315 03/25/24 1315     Right arm 21 %       Physical Exam  General: Well developed patient, alert and oriented, NAD  HEENT: no lesions, no scleral icterus, moist mucous membranes  Neuro: A&Ox3, grossly normal  CV: RRR, nrl S1, S2, no murmur, rubs, or clicks  Resp: Good bilateral air entry. No crackles,  wheezing, or rhonchi  Abdomen: Non distended, + BS, soft, non-tender, no peritoneal signs  Extremities: Tenderness to palpation of the feet and calves bilaterally, no erythema or edema noted.  DP and PT pulses palpated bilaterally.  All toes are cool to touch bilaterally  Skin: No jaundice, no lesions   Psych: Appropriate mood and behavior    ED Course & MDM        Medical Decision Making  63-year-old male with history of extensive right sided DVT with IVC thrombus and bilateral PE in November 2023 presenting with worsening pain in his bilateral feet and calves.  Patient is vitally stable and afebrile.  He has been taking Eliquis since his DVT was discovered in November.  There could be residual clot remaining despite anticoagulation given the extensiveness of his clot at that time however patient reports improvement in his bilateral leg swelling and states he is compliant with his anticoagulation.  Bilateral venous duplex ultrasound was ordered and did show interval resolution of right DVT with no evidence of thrombus.  Low concern for reduced distal blood flow given that his pulses were easily palpated.    Patient was signed out to oncoming resident at 1800.    Procedure  Procedures     Beau Queen DO  Resident  03/25/24 1801

## 2024-03-25 NOTE — ED TRIAGE NOTES
Pt reports bilateral leg and feet pain that he has had for 1.5 weeks. He states that the pain is a 10/10 it hurts when he is ambulating an it hurts when he is lying down. Pt is on eliquis for known DVTs

## 2024-03-26 LAB — HOLD SPECIMEN: NORMAL

## 2024-03-26 NOTE — ED PROVIDER NOTES
Patient was handed off to me from the previous team. For full history, physical, and prior ED course, please see previous provider note prior to patient handoff. This is an addendum to the record.     HOSPITAL COURSE/MEDICAL DECISION MAKING:  Patient is a 63-year-old male with a PMH of alcoholic cirrhosis c/b extensive right DVT with IVC thrombus and bilateral PE in November 2023, presenting with worsening bilateral leg pain for the past few weeks.  At time of signout patient was pending bilateral duplex US and further disposition. Ultrasound demonstrating interval resolution of occlusive thrombus throughout the right proximal femoral, popliteal, peroneal, and posterior tibial veins.  There is no sonographic evidence of DVT in the bilateral lower extremities.  Upon examination the patient's bilateral lower extremity pain is consistent with neuropathic pain likely related to cirrhosis.  There is no evidence of heart failure or radicular back pain on examination.  The patient's pain be treated with Percocet and ibuprofen.  He will be discharged with Rx for gabapentin and has appropriate follow-up care this week with his primary care provider.    IMPRESSIONS:  Bilateral lower extremity pain    DISPOSITION:   Discharged home     CONDITION AT DISPOSITION:  Stable     I reviewed the patient´s case with Dr. Jaeger who also saw the patient and agrees with the plan.     Nathan Oscar DO   Emergency Medicine   PGY-1     Nathan Oscar DO  Resident  03/27/24 5853       Jean Jaeger MD  03/28/24 1614

## 2024-03-26 NOTE — DISCHARGE INSTRUCTIONS
You were seen today in the emergency department for bilateral lower extremity pain.  You were found to have no blood clots in your lower extremities.  Your pain today is consistent with a neuropathic or nerve pain.  Please take the gabapentin as prescribed to you today.  Please follow-up with your primary care provider at your regular scheduled appointment next Wednesday.

## 2024-07-22 ENCOUNTER — APPOINTMENT (OUTPATIENT)
Dept: PAIN MEDICINE | Facility: CLINIC | Age: 64
End: 2024-07-22
Payer: MEDICARE

## 2024-08-09 ENCOUNTER — APPOINTMENT (OUTPATIENT)
Dept: PAIN MEDICINE | Facility: CLINIC | Age: 64
End: 2024-08-09
Payer: MEDICARE

## 2024-08-09 VITALS
WEIGHT: 124 LBS | DIASTOLIC BLOOD PRESSURE: 82 MMHG | SYSTOLIC BLOOD PRESSURE: 118 MMHG | HEIGHT: 70 IN | HEART RATE: 65 BPM | RESPIRATION RATE: 18 BRPM | BODY MASS INDEX: 17.75 KG/M2

## 2024-08-09 DIAGNOSIS — G89.29 CHRONIC LEFT SHOULDER PAIN: ICD-10-CM

## 2024-08-09 DIAGNOSIS — M25.512 CHRONIC LEFT SHOULDER PAIN: ICD-10-CM

## 2024-08-09 PROCEDURE — 99204 OFFICE O/P NEW MOD 45 MIN: CPT | Performed by: PAIN MEDICINE

## 2024-08-09 PROCEDURE — 3008F BODY MASS INDEX DOCD: CPT | Performed by: PAIN MEDICINE

## 2024-08-09 SDOH — SOCIAL STABILITY: SOCIAL NETWORK: SOCIAL ACTIVITY:: 5

## 2024-08-09 ASSESSMENT — PAIN SCALES - GENERAL
PAINLEVEL: 10-WORST PAIN EVER
PAINLEVEL_OUTOF10: 10 - WORST POSSIBLE PAIN

## 2024-08-09 ASSESSMENT — PAIN - FUNCTIONAL ASSESSMENT: PAIN_FUNCTIONAL_ASSESSMENT: 0-10

## 2024-08-09 ASSESSMENT — PAIN DESCRIPTION - DESCRIPTORS: DESCRIPTORS: THROBBING

## 2024-08-09 NOTE — PROGRESS NOTES
Subjective   Patient ID: Junito Rodríguez is a 63 y.o. male with a past medical history of schizophrenia, depression, anxiety, COPD (PFTs not on file), alcohol use disorder, significant tobacco use, and chronic lower back pain.  He presents to us today as a new patient visit for L shoulder pain of 1.5 months duration.    HPI:   Junito Rodríguez is a 63 y.o. male with a past medical history of schizophrenia, depression, anxiety, COPD (PFTs not on file), alcohol use disorder, significant tobacco use, and chronic lower back pain.  He presents to us today as a new patient visit for L shoulder pain of 1.5 months duration. He says the pain started gradually and had no associated trauma. He has seen his PCP for the pain who recommended physical therapy and obtained L shoulder X-ray (not on our system) with no findings of joint deformity. He takes Tylenol 500mg and Gabapentin 300mg x3 daily with minimal relief. Further, he is unable to comply with physical therapy due to the pain. The pain is located around his L shoulder and scapula regions with no radiation to other parts of his neck, arm, or upper thorax. He is unable to actively abduct his arm beyond 90 degrees but has complete range of motion on passive exam. He takes Eliquis for prior history of DVT. Of note, he endorses being prescribed Tramadol and Percocet which relieved his pain but was discontinued for conerns of addiction. He currently rates his pain as a 9/10.    Physical Therapy: The patient has done six or more weeks of physical therapy in the past six months with minimal improvement  Other Conservative Measures he has tried: Heating Pad  Classes of medications tried in the past: Acetaminophen    I have personally reviewed the [facility] report for Junito Rodríguez I have considered the risks of abuse, dependence, addiction and diversion    Last Urine Drug Screen:  Recent Results (from the past 8760 hour(s))   Drug Screen, Urine With Reflex to Confirmation    Collection  Time: 11/25/23 11:10 AM   Result Value Ref Range    Amphetamine Screen, Urine Presumptive Negative Presumptive Negative    Barbiturate Screen, Urine Presumptive Negative Presumptive Negative    Benzodiazepines Screen, Urine Presumptive Negative Presumptive Negative    Cannabinoid Screen, Urine Presumptive Positive (A) Presumptive Negative    Cocaine Metabolite Screen, Urine Presumptive Negative Presumptive Negative    Fentanyl Screen, Urine Presumptive Negative Presumptive Negative    Opiate Screen, Urine Presumptive Positive (A) Presumptive Negative    Oxycodone Screen, Urine Presumptive Positive (A) Presumptive Negative    PCP Screen, Urine Presumptive Negative Presumptive Negative     Review of Systems   13-point ROS done and negative except for HPI.     Current Outpatient Medications   Medication Instructions    apixaban 5 mg (74 tabs) tablets,dose pack Take 2 tablets (10 mg) by mouth 2 times a day for 7 days, THEN 1 tablet (5 mg) 2 times a day for 23 days.    fluticasone furoate-vilanteroL (Breo Ellipta) 200-25 mcg/dose inhaler 1 puff, inhalation, Daily RT    furosemide (LASIX) 20 mg, oral, Daily    gabapentin (NEURONTIN) 600 mg, oral, Nightly    midodrine (PROAMATINE) 5 mg, oral, 3 times daily (morning, midday, late afternoon)    nicotine (Nicoderm CQ) 14 mg/24 hr patch 1 patch, transdermal, Daily    pantoprazole (PROTONIX) 40 mg, oral, Daily before breakfast, Do not crush, chew, or split.    risperiDONE (RISPERDAL) 1 mg, oral, 2 times daily    spironolactone (ALDACTONE) 50 mg, oral, Daily    traZODone (DESYREL) 50 mg, oral, Nightly       No past medical history on file.     Past Surgical History:   Procedure Laterality Date    OTHER SURGICAL HISTORY  02/08/2018    Closed Treatment Of Orbital Fracture (Non-'blowout')    US GUIDED ABDOMINAL PARACENTESIS  11/28/2023    US GUIDED ABDOMINAL PARACENTESIS 11/28/2023 [facility] [place]        No family history on file.     No Known Allergies     Objective     Vitals:    08/09/24  0940   BP: 118/82   Pulse: 65   Resp: 18        Physical Exam  General: NAD, well groomed, well nourished  Eyes: Non-icteric sclera, EOMI  Ears, Nose, Mouth, and Throat: External ears and nose appear to be without deformity or rash. No lesions or masses noted. Hearing is grossly intact.   Neck: Trachea midline  Respiratory: Nonlabored breathing   Cardiovascular: no peripheral edema   Skin: No rashes or open lesions/ulcers identified on skin.    Upper extremities:  TTP around L shoulder, clavicle, and scapula regions.  Active range of motion limited abduction at shoulder joint beyond 90 degrees on L compared to R side  Passive range of motion complete on both sides  No skin deformities, No sensory, motor deficits. Negative Hoffmans and spurlings sign bilaterally    Back:   Palpation: No tenderness to palpation over lumbar paraspinous muscles.     Neurologic:   Cranial nerves grossly intact.   Strength 5/5 and symmetric plantar/dorsiflexion   Sensation: Normal to light touch throughout, pinprick intact throughout.  DTRs:normal and symmetric throughout  Encarnacion: absent  Clonus: absent    Psychiatric: Alert, orientation to person, place, and time. Cooperative.    Imaging personally reviewed and independently interpreted: Xray of shoulder not available on our system.     Assessment/Plan   Junito Rodríguez is a 63 y.o. male with a past medical history of schizophrenia, depression, anxiety, COPD (PFTs not on file), alcohol use disorder, significant tobacco use, and chronic lower back pain.  He presents to us today as a new patient visit for L shoulder pain of 1.5 months duration. Given his history and physical exam he most likely has a component of arthritic joint pain of upper extremity vs less likely frozen shoulder. We opted to perform and in office suprascapular nerve block after having explained the risks and benefits to the patient. The patient was willing to go forward with the block. The procedure was done under  sterile conditions. The skin around the L scapula was prepped with chlorhexidine and using landmark technique, junction of the lateral 1/3 of the scapula spine to the medial 2/3s of the spine was identified. A 26 gauge Touhy needle was then advanced just above this point until scapula os was felt. After confirming negative aspiration, 8ml of 0.75% Bupivacaine was thence injected with intermittent negative aspirations after every 3ml of injectate. Patient tolerated the procedure well with no immediate complications and was reevaluated after 10 minutes with significant pain relief and improved ability to move his L shoulder. Also stated that his pain scale had gone down from a 9/10 to a 5/10    Plan:  - Subacromial joint corticosteroid injection under fluoroscopic guidance.    The patient was invited to contact us back anytime with any questions or concerns and follow-up with us in the office as needed.     There are no diagnoses linked to this encounter.    This note was generated with the aid of dictation software, there may be typos despite my attempts at proofreading.

## 2024-08-12 ENCOUNTER — TELEPHONE (OUTPATIENT)
Dept: PAIN MEDICINE | Facility: CLINIC | Age: 64
End: 2024-08-12
Payer: MEDICARE

## 2024-08-12 NOTE — TELEPHONE ENCOUNTER
Patient called follow up, after in office shoulder injection. Pt report he had relief, but now pain has returned and is more painful. I reviewed chart with Dr. Dobbins, whom reports this is expected. Pt needs to keep scheduled appointment for subacromial injection on the 9/11/24. I advised of what Dr. Dobbins reports. I advised patient can do OTC medication or creams for pain. Patient is currently taking gabapentin also. Patient is aware scheduled injection appointment and understands.

## 2024-09-05 ENCOUNTER — APPOINTMENT (OUTPATIENT)
Dept: PAIN MEDICINE | Facility: CLINIC | Age: 64
End: 2024-09-05
Payer: MEDICARE

## 2024-09-05 DIAGNOSIS — M54.12 CERVICAL RADICULITIS: Primary | ICD-10-CM

## 2024-09-05 PROCEDURE — 99213 OFFICE O/P EST LOW 20 MIN: CPT | Performed by: PAIN MEDICINE

## 2024-09-05 NOTE — PROGRESS NOTES
9/5/2024    Virtual interview    Mr. Rodríguez had virtual interview today. He reported the suprascapular block helped for one day. Pain improved by 50 % or more. Still doing his home  . He schedule to have shoulder subacromial injection  He asked for soma meds I declined    Plan  Go ahead and doing the subacromial injection        MD Grace Hickman MD   Resident  General Surgery     Progress Notes      Signed     Encounter Date: 8/9/2024     Signed       Expand All Collapse All       Subjective  Patient ID: Junito Rodríguez is a 63 y.o. male with a past medical history of schizophrenia, depression, anxiety, COPD (PFTs not on file), alcohol use disorder, significant tobacco use, and chronic lower back pain.  He presents to us today as a new patient visit for L shoulder pain of 1.5 months duration.     HPI:   Junito Rodríguez is a 63 y.o. male with a past medical history of schizophrenia, depression, anxiety, COPD (PFTs not on file), alcohol use disorder, significant tobacco use, and chronic lower back pain.  He presents to us today as a new patient visit for L shoulder pain of 1.5 months duration. He says the pain started gradually and had no associated trauma. He has seen his PCP for the pain who recommended physical therapy and obtained L shoulder X-ray (not on our system) with no findings of joint deformity. He takes Tylenol 500mg and Gabapentin 300mg x3 daily with minimal relief. Further, he is unable to comply with physical therapy due to the pain. The pain is located around his L shoulder and scapula regions with no radiation to other parts of his neck, arm, or upper thorax. He is unable to actively abduct his arm beyond 90 degrees but has complete range of motion on passive exam. He takes Eliquis for prior history of DVT. Of note, he endorses being prescribed Tramadol and Percocet which relieved his pain but was discontinued for conerns of addiction. He currently rates his pain as a  9/10.     Physical Therapy: The patient has done six or more weeks of physical therapy in the past six months with minimal improvement  Other Conservative Measures he has tried: Heating Pad  Classes of medications tried in the past: Acetaminophen     I have personally reviewed the OARRS report for Junito COT[place] I have considered the risks of abuse, dependence, addiction and diversion     Last Urine Drug Screen:  Recent Results         Recent Results (from the past 8760 hour(s))   Drug Screen, Urine With Reflex to Confirmation     Collection Time: 11/25/23 11:10 AM   Result Value Ref Range     Amphetamine Screen, Urine Presumptive Negative Presumptive Negative     Barbiturate Screen, Urine Presumptive Negative Presumptive Negative     Benzodiazepines Screen, Urine Presumptive Negative Presumptive Negative     Cannabinoid Screen, Urine Presumptive Positive (A) Presumptive Negative     Cocaine Metabolite Screen, Urine Presumptive Negative Presumptive Negative     Fentanyl Screen, Urine Presumptive Negative Presumptive Negative     Opiate Screen, Urine Presumptive Positive (A) Presumptive Negative     Oxycodone Screen, Urine Presumptive Positive (A) Presumptive Negative     PCP Screen, Urine Presumptive Negative Presumptive Negative               Review of Systems  13-point ROS done and negative except for HPI.           Current Outpatient Medications   Medication Instructions    apixaban 5 mg (74 tabs) tablets,dose pack Take 2 tablets (10 mg) by mouth 2 times a day for 7 days, THEN 1 tablet (5 mg) 2 times a day for 23 days.    fluticasone furoate-vilanteroL (Breo Ellipta) 200-25 mcg/dose inhaler 1 puff, inhalation, Daily RT    furosemide (LASIX) 20 mg, oral, Daily    gabapentin (NEURONTIN) 600 mg, oral, Nightly    midodrine (PROAMATINE) 5 mg, oral, 3 times daily (morning, midday, late afternoon)    nicotine (Nicoderm CQ) 14 mg/24 hr patch 1 patch, transdermal, Daily    pantoprazole (PROTONIX) 40 mg, oral, Daily before  breakfast, Do not crush, chew, or split.    risperiDONE (RISPERDAL) 1 mg, oral, 2 times daily    spironolactone (ALDACTONE) 50 mg, oral, Daily    traZODone (DESYREL) 50 mg, oral, Nightly         Medical History   No past medical history on file.         Surgical History         Past Surgical History:   Procedure Laterality Date    OTHER SURGICAL HISTORY   02/08/2018     Closed Treatment Of Orbital Fracture (Non-'blowout')    US GUIDED ABDOMINAL PARACENTESIS   11/28/2023     US GUIDED ABDOMINAL PARACENTESIS 11/28/2023 [facility]            Family History   No family history on file.         RX Allergies   No Known Allergies               Objective      Vitals:     08/09/24 0940   BP: 118/82   Pulse: 65   Resp: 18         Physical Exam  General: NAD, well groomed, well nourished  Eyes: Non-icteric sclera, EOMI  Ears, Nose, Mouth, and Throat: External ears and nose appear to be without deformity or rash. No lesions or masses noted. Hearing is grossly intact.   Neck: Trachea midline  Respiratory: Nonlabored breathing   Cardiovascular: no peripheral edema   Skin: No rashes or open lesions/ulcers identified on skin.     Upper extremities:  TTP around L shoulder, clavicle, and scapula regions.  Active range of motion limited abduction at shoulder joint beyond 90 degrees on L compared to R side  Passive range of motion complete on both sides  No skin deformities, No sensory, motor deficits. Negative Hoffmans and spurlings sign bilaterally     Back:   Palpation: No tenderness to palpation over lumbar paraspinous muscles.      Neurologic:   Cranial nerves grossly intact.   Strength 5/5 and symmetric plantar/dorsiflexion   Sensation: Normal to light touch throughout, pinprick intact throughout.  DTRs:normal and symmetric throughout  Encarnacion: absent  Clonus: absent     Psychiatric: Alert, orientation to person, place, and time. Cooperative.     Imaging personally reviewed and independently interpreted: Xray of shoulder not  available on our system.            Assessment/Plan  Junito Rodríguez is a 63 y.o. male with a past medical history of schizophrenia, depression, anxiety, COPD (PFTs not on file), alcohol use disorder, significant tobacco use, and chronic lower back pain.  He presents to us today as a new patient visit for L shoulder pain of 1.5 months duration. Given his history and physical exam he most likely has a component of arthritic joint pain of upper extremity vs less likely frozen shoulder. We opted to perform and in office suprascapular nerve block after having explained the risks and benefits to the patient. The patient was willing to go forward with the block. The procedure was done under sterile conditions. The skin around the L scapula was prepped with chlorhexidine and using landmark technique, junction of the lateral 1/3 of the scapula spine to the medial 2/3s of the spine was identified. A 26 gauge Touhy needle was then advanced just above this point until scapula os was felt. After confirming negative aspiration, 8ml of 0.75% Bupivacaine was thence injected with intermittent negative aspirations after every 3ml of injectate. Patient tolerated the procedure well with no immediate complications and was reevaluated after 10 minutes with significant pain relief and improved ability to move his L shoulder. Also stated that his pain scale had gone down from a 9/10 to a 5/10     Plan:  - Subacromial joint corticosteroid injection under fluoroscopic guidance.     The patient was invited to contact us back anytime with any questions or concerns and follow-up with us in the office as needed.      There are no diagnoses linked to this encounter.     This note was generated with the aid of dictation software, there may be typos despite my attempts at proofreading.                    Cosigned by: Jenny Dobbins MD at 8/9/2024 11:30 AM   Electronically signed by Grace Carrero MD at 8/9/2024 11:30 AM  Electronically signed by  "Jenny Dobbins MD at 8/9/2024 11:30 AM         Office Visit on 8/9/2024            Revision History          Note shared with patient  Additional Documentation    Vitals: /82     Pulse 65     Resp 18     Ht 1.765 m (5' 9.5\")     Wt 56.2 kg (124 lb)     BMI 18.05 kg/m²     BSA 1.66 m²     Pain Sc 10-Worst pain ever (Loc: Shoulder)          More Vitals   Flowsheets: Interfaced Flowsheet Data,     RADAR AP SCORING,     Pain Disability Index,     Pain Assessment,     Pain Assessment   Encounter Info: Billing Info,     History,     Allergies     Orders Placed    [place] pain management  Joint Injection/Aspiration  Medication Changes      None  Medication List  Visit Diagnoses      Chronic left shoulder pain  Problem List  "

## 2024-09-11 ENCOUNTER — HOSPITAL ENCOUNTER (OUTPATIENT)
Dept: OPERATING ROOM | Facility: CLINIC | Age: 64
Setting detail: OUTPATIENT SURGERY
Discharge: HOME | End: 2024-09-11
Payer: MEDICARE

## 2024-09-11 VITALS
SYSTOLIC BLOOD PRESSURE: 131 MMHG | OXYGEN SATURATION: 98 % | WEIGHT: 120.37 LBS | RESPIRATION RATE: 18 BRPM | DIASTOLIC BLOOD PRESSURE: 88 MMHG | TEMPERATURE: 97.3 F | HEART RATE: 74 BPM | BODY MASS INDEX: 17.83 KG/M2 | HEIGHT: 69 IN

## 2024-09-11 DIAGNOSIS — G89.29 CHRONIC LEFT SHOULDER PAIN: ICD-10-CM

## 2024-09-11 DIAGNOSIS — M25.512 CHRONIC LEFT SHOULDER PAIN: ICD-10-CM

## 2024-09-11 PROCEDURE — 2500000004 HC RX 250 GENERAL PHARMACY W/ HCPCS (ALT 636 FOR OP/ED): Performed by: PAIN MEDICINE

## 2024-09-11 PROCEDURE — 7100000010 HC PHASE TWO TIME - EACH INCREMENTAL 1 MINUTE

## 2024-09-11 PROCEDURE — 20610 DRAIN/INJ JOINT/BURSA W/O US: CPT | Performed by: PAIN MEDICINE

## 2024-09-11 PROCEDURE — 3600000006 HC OR TIME - EACH INCREMENTAL 1 MINUTE - PROCEDURE LEVEL ONE

## 2024-09-11 PROCEDURE — 2500000005 HC RX 250 GENERAL PHARMACY W/O HCPCS: Performed by: PAIN MEDICINE

## 2024-09-11 PROCEDURE — 3600000001 HC OR TIME - INITIAL BASE CHARGE - PROCEDURE LEVEL ONE

## 2024-09-11 PROCEDURE — 7100000009 HC PHASE TWO TIME - INITIAL BASE CHARGE

## 2024-09-11 PROCEDURE — 77002 NEEDLE LOCALIZATION BY XRAY: CPT | Performed by: PAIN MEDICINE

## 2024-09-11 RX ORDER — MIDAZOLAM HYDROCHLORIDE 1 MG/ML
2 INJECTION, SOLUTION INTRAMUSCULAR; INTRAVENOUS ONCE
Status: DISCONTINUED | OUTPATIENT
Start: 2024-09-11 | End: 2024-09-12 | Stop reason: HOSPADM

## 2024-09-11 RX ORDER — METHYLPREDNISOLONE ACETATE 40 MG/ML
20 INJECTION, SUSPENSION INTRA-ARTICULAR; INTRALESIONAL; INTRAMUSCULAR; SOFT TISSUE ONCE
Status: DISCONTINUED | OUTPATIENT
Start: 2024-09-11 | End: 2024-09-12 | Stop reason: HOSPADM

## 2024-09-11 RX ORDER — TRIAMCINOLONE ACETONIDE 40 MG/ML
INJECTION, SUSPENSION INTRA-ARTICULAR; INTRAMUSCULAR AS NEEDED
Status: COMPLETED | OUTPATIENT
Start: 2024-09-11 | End: 2024-09-11

## 2024-09-11 RX ORDER — ROPIVACAINE HYDROCHLORIDE 7.5 MG/ML
10 INJECTION, SOLUTION EPIDURAL; PERINEURAL ONCE
Status: DISCONTINUED | OUTPATIENT
Start: 2024-09-11 | End: 2024-09-12 | Stop reason: HOSPADM

## 2024-09-11 RX ORDER — ROPIVACAINE HYDROCHLORIDE 5 MG/ML
INJECTION, SOLUTION EPIDURAL; INFILTRATION; PERINEURAL AS NEEDED
Status: COMPLETED | OUTPATIENT
Start: 2024-09-11 | End: 2024-09-11

## 2024-09-11 ASSESSMENT — PATIENT HEALTH QUESTIONNAIRE - PHQ9
1. LITTLE INTEREST OR PLEASURE IN DOING THINGS: NOT AT ALL
2. FEELING DOWN, DEPRESSED OR HOPELESS: NOT AT ALL
SUM OF ALL RESPONSES TO PHQ9 QUESTIONS 1 AND 2: 0

## 2024-09-11 ASSESSMENT — COLUMBIA-SUICIDE SEVERITY RATING SCALE - C-SSRS
6. HAVE YOU EVER DONE ANYTHING, STARTED TO DO ANYTHING, OR PREPARED TO DO ANYTHING TO END YOUR LIFE?: NO
1. IN THE PAST MONTH, HAVE YOU WISHED YOU WERE DEAD OR WISHED YOU COULD GO TO SLEEP AND NOT WAKE UP?: NO
2. HAVE YOU ACTUALLY HAD ANY THOUGHTS OF KILLING YOURSELF?: NO

## 2024-09-11 ASSESSMENT — PAIN - FUNCTIONAL ASSESSMENT
PAIN_FUNCTIONAL_ASSESSMENT: 0-10
PAIN_FUNCTIONAL_ASSESSMENT: 0-10

## 2024-09-11 ASSESSMENT — PAIN SCALES - GENERAL
PAINLEVEL_OUTOF10: 0 - NO PAIN
PAINLEVEL_OUTOF10: 8

## 2024-09-11 ASSESSMENT — ENCOUNTER SYMPTOMS
OCCASIONAL FEELINGS OF UNSTEADINESS: 0
LOSS OF SENSATION IN FEET: 0
DEPRESSION: 0

## 2024-09-11 NOTE — BRIEF OP NOTE
Junito Rodríguez is a 63 y.o. male  with  left shoulder pain here for left shoulder subacromial steroid injection.     Procedures performed:   1.  left shoulder subacromial steroid injection    Indication: left shoulder pain, chronic    Performed by: Dr. Dobbins  Assistant: Morgan Oneill DO     Informed consent was obtained and verified during a time-out procedure.  The patient was prepped and draped in the usual sterile fashion using a plastic drape and chloraprep. A needle was advanced to the target area in the left shoulder, 4mL of .75% ropivacaine with 40mg methylprednisolone was injected    Hemostasis was ensured.  Band-Aids were applied.    Post-procedure details:   Procedure completion:  Tolerated well, no immediate complications

## 2024-09-11 NOTE — DISCHARGE INSTRUCTIONS
DISCHARGE INSTRUCTIONS FOR INJECTIONS       After most injections, it is recommended that you relax and limit your activity for the remainder of the day unless you have been told otherwise by your pain physician.  You should not drive a car, operate machinery, or make important legal decisions unless otherwise directed by your pain physician.  You may resume your normal activity, including exercise, tomorrow.      Keep a written pain diary of how much pain relief you experienced following the injection procedure and the length of time of pain relief you experienced pain relief. Following diagnostic injections like medial branch nerve blocks, sacroiliac joint blocks, stellate ganglion injections and other blocks, it is very important you record the specific amount of pain relief you experienced immediately after the injectionand how long it lasted. Your doctor will ask you for this information at your follow up visit.     For all injections, please keep the injection site dry and inspect the site for a couple of days. You may remove the Band-Aid the day of the injection at any time.     Some discomfort, bruising or slight swelling may occur at the injection site. This is not abnormal if it occurs.  If needed you may:    -Take over the counter medication such as Tylenol or Motrin.   -Apply an ice pack for 30 minutes, 2 to 3 times a day for the first 24 hours.     You may shower today; no soaking baths, hot tubs, whirlpools or swimming pools for two days.      If you are given steroids in your injection, it may take 3-5 days for the steroid medication to take effect. You may notice a worsening of your symptoms for 1-2 days after the injection. This is not abnormal.  You may use acetaminophen, ibuprofen, or prescription medication that your doctor may have prescribed for you if you need to do so.     A few common side effects of steroids include facial flushing, sweating, restlessness, irritability,difficulty sleeping,  increase in blood sugar, and increased blood pressure. If you have diabetes, please monitor your blood sugar at least once a day for at least 5 days. If you have poorly controlled high blood pressure, monitoryour blood pressure for at least 2 days and contact your primary care physician if these numbers are unusually high for you.      If you take aspirin or non-steroidal anti-inflammatory drugs (examples are Motrin, Advil, ibuprofen, Naprosyn, Voltaren, Relafen, etc.) you may restart these this evening, but stop taking it 3 days before your next appointment, unless instructed otherwiseby your physician.      You do not need to discontinue non-aspirin-containing pain medications prior to an injection (examples: Celebrex, tramadol, hydrocodone and acetaminophen).      If you take a blood thinning medication (Coumadin, Lovenox, Fragmin,Ticlid, Plavix, Pradaxa, etc.), please discuss this with your primary care physician/cardiologist and your pain physician. These medications MUST be discontinued before you can have an injection safely, without the risk of uncontrolled bleeding. If these medications are not discontinued for an appropriate period of time, you will not be able to receivean injection. Please adhere to instructions given to you about when to restart your blood thinning medication. If you have any questions please reach out to our team.    If you are taking Coumadin, please have your INR checked the morning of your procedure and bring the result to your appointment unless otherwise instructed. If your INR is over 1.2, your injection may need to be rescheduled to avoid uncontrolled bleeding from the needle placement.     Call [facility]  and ask for Pain Management at [phone] between 8am-4pm Monday - Friday if you are experiencing the following:    If you received an epidural or spinal injection:    -Headache that doesnot go away with medicine, is worse when sitting or standing up, and is greatly  relieved upon lying down.   -Severe pain worse than or different than your baseline pain.   -Chills or fever (101º F or greater).   -Drainage or signs of infection at the injection site     Go directly to the Emergency Department if you are experiencing the following and received an epidural or spinal injection:   -Abrupt weakness or progressive weakness in your legs that starts after you leave the clinic.   -Abrupt severe or worsening numbness in your legs.   -Inability to urinate after the injection or loss of bowel or bladder control without the urge to defecate or urinate.     If you have a clinical question that cannot wait until your next appointment, please call [phone] between 8am-4pm Monday - Friday or send a [employer] message. We do our best to return all non-emergency messages within 24 hours, Monday - Friday. A nurse or physician will return your message. You may also try calling Dr. Sarath Kang/Dr. Dobbins's nurse ([phone]), and they will do their best to answer your question(s).    If you need to cancel an appointment, please call the scheduling staff at [phone] during normal business hours or leave a message at least 24 hours in advance.     If you are going to be sedated for your next procedure, you MUST have responsible adult who can legally drive accompany you home. You cannot eat or drink for at least eight hours prior to the planned procedure if you are going to receive sedation. You may take your non-blood thinning medications with a small sip of water.

## 2024-09-11 NOTE — H&P
Pain Management H&P    History Of Present Illness  Junito Rodríguez is a 63 y.o. male presents for procedure state below. Endorses no changes in past medical history or medical health since last seen in clinic.      Past Medical History  He has no past medical history on file.    Surgical History  He has a past surgical history that includes Other surgical history (02/08/2018) and US guided abdominal paracentesis (11/28/2023).     Social History  He reports that he has been smoking cigarettes. He does not have any smokeless tobacco history on file. No history on file for alcohol use and drug use.    Family History  No family history on file.     Allergies  Patient has no known allergies.    Review of Symptoms:   Constitutional: Negative for chills, diaphoresis or fever  HENT: Negative for neck swelling  Eyes:.  Negative for eye pain  Respiratory:.  Negative for cough, shortness of breath or wheezing    Cardiovascular:.  Negative for chest pain or palpitations  Gastrointestinal:.  Negative for abdominal pain, nausea and vomiting  Genitourinary:.  Negative for urgency  Musculoskeletal: Positive for joint pain. No noted falls within the past 3 months.  Skin: Negative for wounds or itching   Neurological: Negative for dizziness, seizures, loss of consciousness and weakness  Endo/Heme/Allergies: Does not bruise/bleed easily  Psychiatric/Behavioral: Negative for depression. The patient does not appear anxious.       PHYSICAL EXAM  Vitals signs reviewed  Constitutional:       General: Not in acute distress     Appearance: Normal appearance. Not ill-appearing.  HENT:     Head: Normocephalic and atraumatic  Eyes:     Conjunctiva/sclera: Conjunctivae normal  Cardiovascular:     Rate and Rhythm: Normal rate and regular rhythm  Pulmonary:     Effort: No respiratory distress  Abdominal:     Palpations: Abdomen is soft  Musculoskeletal: DIMAS  Skin:     General: Skin is warm and dry  Neurological:     General: No focal deficit  present  Psychiatric:         Mood and Affect: Mood normal         Behavior: Behavior normal     Last Recorded Vitals  There were no vitals taken for this visit.    Relevant Results  Current Outpatient Medications   Medication Instructions    apixaban 5 mg (74 tabs) tablets,dose pack Take 2 tablets (10 mg) by mouth 2 times a day for 7 days, THEN 1 tablet (5 mg) 2 times a day for 23 days.    fluticasone furoate-vilanteroL (Breo Ellipta) 200-25 mcg/dose inhaler 1 puff, inhalation, Daily RT    furosemide (LASIX) 20 mg, oral, Daily    gabapentin (NEURONTIN) 600 mg, oral, Nightly    midodrine (PROAMATINE) 5 mg, oral, 3 times daily (morning, midday, late afternoon)    nicotine (Nicoderm CQ) 14 mg/24 hr patch 1 patch, transdermal, Daily    pantoprazole (PROTONIX) 40 mg, oral, Daily before breakfast, Do not crush, chew, or split.    risperiDONE (RISPERDAL) 1 mg, oral, 2 times daily    spironolactone (ALDACTONE) 50 mg, oral, Daily    traZODone (DESYREL) 50 mg, oral, Nightly       No results found for this or any previous visit from the past 1000 days.     No image results found.       No diagnosis found.     ASSESSMENT/PLAN  Junito Rodríguez is a 63 y.o. male presenting for left shoulder subacromial injection     Patient denies any recent antibiotic use or infections, denies any blood thinner use, and denies contrast or local anesthetic allergies     Risks, benefits, alternatives discussed. All questions answered to the best of my ability. Patient agrees to proceed.      Our plan is as follows:  - Proceed with aforementioned procedure          Morgan Oneill DO   Pain fellow

## 2025-03-04 DIAGNOSIS — Z12.11 SCREENING FOR COLON CANCER: ICD-10-CM

## 2025-03-04 RX ORDER — POLYETHYLENE GLYCOL 3350, SODIUM SULFATE ANHYDROUS, SODIUM BICARBONATE, SODIUM CHLORIDE, POTASSIUM CHLORIDE 236; 22.74; 6.74; 5.86; 2.97 G/4L; G/4L; G/4L; G/4L; G/4L
POWDER, FOR SOLUTION ORAL
Qty: 4000 ML | Refills: 0 | Status: SHIPPED | OUTPATIENT
Start: 2025-03-04

## 2025-03-12 ENCOUNTER — ANESTHESIA EVENT (OUTPATIENT)
Dept: GASTROENTEROLOGY | Facility: HOSPITAL | Age: 65
End: 2025-03-12

## 2025-03-12 RX ORDER — SODIUM CHLORIDE, SODIUM LACTATE, POTASSIUM CHLORIDE, CALCIUM CHLORIDE 600; 310; 30; 20 MG/100ML; MG/100ML; MG/100ML; MG/100ML
50 INJECTION, SOLUTION INTRAVENOUS CONTINUOUS
OUTPATIENT
Start: 2025-03-12 | End: 2025-03-12

## 2025-03-12 RX ORDER — ONDANSETRON HYDROCHLORIDE 2 MG/ML
4 INJECTION, SOLUTION INTRAVENOUS ONCE AS NEEDED
OUTPATIENT
Start: 2025-03-12

## 2025-03-13 ENCOUNTER — APPOINTMENT (OUTPATIENT)
Dept: GASTROENTEROLOGY | Facility: HOSPITAL | Age: 65
End: 2025-03-13
Payer: COMMERCIAL

## 2025-03-13 ENCOUNTER — ANESTHESIA (OUTPATIENT)
Dept: GASTROENTEROLOGY | Facility: HOSPITAL | Age: 65
End: 2025-03-13
Payer: COMMERCIAL

## 2025-05-01 NOTE — ANESTHESIA PREPROCEDURE EVALUATION
Patient: Junito Rodríguez    Procedure Information       Date/Time: 05/01/25 1420    Scheduled providers: Sheeba Arrieta MD; Fausto Salinas MD; AGNIESZKA Shepard; Mansi Mathews RN; Danielle Burrell RN; Eliane Salvador RN    Procedure: COLONOSCOPY    Location: [facility]            Relevant Problems   Pulmonary   (+) Acute pulmonary embolism without acute cor pulmonale, unspecified pulmonary embolism type (Multi)       Clinical information reviewed:                    Medical History[1]   Surgical History[2]  Social History[3]   Current Outpatient Medications   Medication Instructions    apixaban 5 mg (74 tabs) tablets,dose pack Take 2 tablets (10 mg) by mouth 2 times a day for 7 days, THEN 1 tablet (5 mg) 2 times a day for 23 days.    fluticasone furoate-vilanteroL (Breo Ellipta) 200-25 mcg/dose inhaler 1 puff, inhalation, Daily RT    furosemide (LASIX) 20 mg, oral, Daily    gabapentin (NEURONTIN) 600 mg, oral, Nightly    midodrine (PROAMATINE) 5 mg, oral, 3 times daily (morning, midday, late afternoon)    nicotine (Nicoderm CQ) 14 mg/24 hr patch 1 patch, transdermal, Daily    pantoprazole (PROTONIX) 40 mg, oral, Daily before breakfast, Do not crush, chew, or split.    polyethylene glycol (GaviLyte-G) 236-22.74-6.74 -5.86 gram solution Please refer to the printed instructions that were mailed to you.    risperiDONE (RISPERDAL) 1 mg, oral, 2 times daily    spironolactone (ALDACTONE) 50 mg, oral, Daily    traZODone (DESYREL) 50 mg, oral, Nightly      RX Allergies[4]     Chemistry    Lab Results   Component Value Date/Time     03/25/2024 1621    K 3.5 03/25/2024 1621    CL 96 (L) 03/25/2024 1621    CO2 30 03/25/2024 1621    BUN 6 03/25/2024 1621    CREATININE 0.72 03/25/2024 1621    Lab Results   Component Value Date/Time    CALCIUM 9.0 03/25/2024 1621    ALKPHOS 85 03/25/2024 1621    AST 17 03/25/2024 1621    ALT 8 (L) 03/25/2024 1621    BILITOT 0.3 03/25/2024 1621          Lab Results  "  Component Value Date    HGBA1C 4.3 11/25/2023     Lab Results   Component Value Date/Time    WBC 8.9 03/25/2024 1621    HGB 12.6 (L) 03/25/2024 1621    HCT 36.7 (L) 03/25/2024 1621     03/25/2024 1621     Lab Results   Component Value Date/Time    PROTIME 17.4 (H) 12/01/2023 0933    INR 1.5 (H) 12/01/2023 0933     No results found for: \"ABORH\"  No results found for this or any previous visit (from the past 4464 hours).  No results found for this or any previous visit from the past 1095 days.    Echo 11/25/2023:   Left Ventricle: The left ventricular systolic function is hyperdynamic, with an estimated ejection fraction of 70-75%. There are no regional wall motion abnormalities. The left ventricular cavity size is normal. Spectral Doppler shows an impaired relaxation pattern of left ventricular diastolic filling.  Left Atrium: The left atrium is normal in size.  Right Ventricle: The right ventricle is normal in size. There is normal right ventricular global systolic function.  Right Atrium: The right atrium is normal in size.  Aortic Valve: The aortic valve appears structurally normal. There is minimal aortic valve cusp calcification. There is no evidence of aortic valve regurgitation. The peak instantaneous gradient of the aortic valve is 5.9 mmHg.  Mitral Valve: The mitral valve is normal in structure. There is trace mitral valve regurgitation.  Tricuspid Valve: The tricuspid valve is structurally normal. There is trace tricuspid regurgitation. The Doppler estimated RVSP is within normal limits at 29.4 mmHg.  Pulmonic Valve: The pulmonic valve is structurally normal. There is no indication of pulmonic valve regurgitation.  Pericardium: There is no pericardial effusion noted.  Aorta: The aortic root is normal.  Systemic Veins: The inferior vena cava appears to be of normal size.  In comparison to the previous echocardiogram(s): There are no prior studies on this patient for comparison purposes.   "   CONCLUSIONS:   1. Left ventricular systolic function is hyperdynamic with a 70-75% estimated ejection fraction.   2. Poorly visualized anatomical structures due to suboptimal image quality.   3. Spectral Doppler shows an impaired relaxation pattern of left ventricular diastolic filling.   4. RVSP within normal limits.   5. No signs of RV strain.   6. There are no prior studies on this patient for comparison purposes.    Visit Vitals  Smoking Status Every Day     No data recorded    PHYSICAL EXAM    Anesthesia Plan         [1]   Past Medical History:  Diagnosis Date    Alcohol use disorder     Anxiety     Cirrhosis (Multi)     COPD (chronic obstructive pulmonary disease) (Multi)     Depression     History of DVT (deep vein thrombosis) 11/24/2023    History of pulmonary embolus (PE) 11/24/2023    Schizophrenia    [2]   Past Surgical History:  Procedure Laterality Date    COLONOSCOPY      ESOPHAGOGASTRODUODENOSCOPY      OTHER SURGICAL HISTORY  02/08/2018    Closed Treatment Of Orbital Fracture (Non-'blowout')    US GUIDED ABDOMINAL PARACENTESIS  11/28/2023    US GUIDED ABDOMINAL PARACENTESIS 11/28/2023 [facility] [place]   [3]   Social History  Tobacco Use    Smoking status: Every Day     Types: Cigarettes   Substance Use Topics    Alcohol use: Not Currently   [4] No Known Allergies